# Patient Record
Sex: FEMALE | Race: WHITE | NOT HISPANIC OR LATINO | Employment: UNEMPLOYED | URBAN - METROPOLITAN AREA
[De-identification: names, ages, dates, MRNs, and addresses within clinical notes are randomized per-mention and may not be internally consistent; named-entity substitution may affect disease eponyms.]

---

## 2020-01-23 ENCOUNTER — HOSPITAL ENCOUNTER (EMERGENCY)
Facility: HOSPITAL | Age: 50
Discharge: HOME | End: 2020-01-23
Attending: EMERGENCY MEDICINE | Admitting: EMERGENCY MEDICINE

## 2020-01-23 ENCOUNTER — APPOINTMENT (OUTPATIENT)
Dept: GENERAL RADIOLOGY | Facility: HOSPITAL | Age: 50
End: 2020-01-23

## 2020-01-23 VITALS
SYSTOLIC BLOOD PRESSURE: 125 MMHG | RESPIRATION RATE: 16 BRPM | DIASTOLIC BLOOD PRESSURE: 73 MMHG | WEIGHT: 180 LBS | TEMPERATURE: 99 F | OXYGEN SATURATION: 99 % | HEIGHT: 65 IN | BODY MASS INDEX: 29.99 KG/M2 | HEART RATE: 80 BPM

## 2020-01-23 DIAGNOSIS — S30.0XXA CONTUSION OF BUTTOCK, INITIAL ENCOUNTER: Primary | ICD-10-CM

## 2020-01-23 DIAGNOSIS — M54.30 SCIATICA, UNSPECIFIED LATERALITY: ICD-10-CM

## 2020-01-23 PROCEDURE — 96372 THER/PROPH/DIAG INJ SC/IM: CPT

## 2020-01-23 PROCEDURE — 25010000002 KETOROLAC TROMETHAMINE PER 15 MG: Performed by: EMERGENCY MEDICINE

## 2020-01-23 PROCEDURE — 72220 X-RAY EXAM SACRUM TAILBONE: CPT

## 2020-01-23 PROCEDURE — 99283 EMERGENCY DEPT VISIT LOW MDM: CPT

## 2020-01-23 PROCEDURE — 72170 X-RAY EXAM OF PELVIS: CPT

## 2020-01-23 RX ORDER — VENLAFAXINE HYDROCHLORIDE 150 MG/1
150 CAPSULE, EXTENDED RELEASE ORAL DAILY
COMMUNITY

## 2020-01-23 RX ORDER — ONDANSETRON 4 MG/1
4 TABLET, FILM COATED ORAL ONCE
Status: COMPLETED | OUTPATIENT
Start: 2020-01-23 | End: 2020-01-23

## 2020-01-23 RX ORDER — CYCLOBENZAPRINE HCL 5 MG
5 TABLET ORAL 3 TIMES DAILY PRN
Qty: 20 TABLET | Refills: 0 | Status: SHIPPED | OUTPATIENT
Start: 2020-01-23 | End: 2020-07-20

## 2020-01-23 RX ORDER — NAPROXEN 500 MG/1
500 TABLET ORAL 2 TIMES DAILY PRN
Qty: 20 TABLET | Refills: 0 | Status: SHIPPED | OUTPATIENT
Start: 2020-01-23 | End: 2020-07-20

## 2020-01-23 RX ORDER — KETOROLAC TROMETHAMINE 15 MG/ML
15 INJECTION, SOLUTION INTRAMUSCULAR; INTRAVENOUS ONCE
Status: COMPLETED | OUTPATIENT
Start: 2020-01-23 | End: 2020-01-23

## 2020-01-23 RX ORDER — HYDROCODONE BITARTRATE AND ACETAMINOPHEN 5; 325 MG/1; MG/1
1 TABLET ORAL ONCE
Status: COMPLETED | OUTPATIENT
Start: 2020-01-23 | End: 2020-01-23

## 2020-01-23 RX ADMIN — ONDANSETRON HYDROCHLORIDE 4 MG: 4 TABLET, FILM COATED ORAL at 22:29

## 2020-01-23 RX ADMIN — HYDROCODONE BITARTRATE AND ACETAMINOPHEN 1 TABLET: 5; 325 TABLET ORAL at 22:29

## 2020-01-23 RX ADMIN — KETOROLAC TROMETHAMINE 15 MG: 15 INJECTION, SOLUTION INTRAMUSCULAR; INTRAVENOUS at 22:28

## 2020-01-24 NOTE — DISCHARGE INSTRUCTIONS
Follow up with one of the [facility] Primary Care Providers below to setup primary care. If you need assistance coordinating a primary care appointment with a [facility] Primary Care Provider, please contact the Primary Care Coordinators at [phone] for appointment scheduling.    [facility], Primary Care   [address] , [address]   [place], [place] [address] [phone]    [facility] Internal Medicine & Endocrinology  [address], [address]  [place] [address] [phone]    [facility] Family Medicine  [address], [address]   [place] [address] [phone]    [facility] Primary Care  [address], [address]  [place], [place] [address] [phone]    [facility], Primary Care,   [address], [address]   [place], [place] [address] [phone]    [facility] Primary Care  [address]  [place], [place] [address] [phone]    [facility], Primary Care  [address], [address]  [place], [place] [address] [phone]    [facility] Internal Medicine & Pediatrics  [address], [address]   [place] [address] [phone]    [facility], Primary Care  [address] [place] [address] [phone]      [facility] Primary Care  [address], [address]   [place] [address] [phone]    [facility] Family Medicine  [address] [place] [address] [phone]

## 2020-01-24 NOTE — ED PROVIDER NOTES
Subjective   49-year-old female presents for evaluation of left tailbone and buttocks pain.  She states that 3 days ago she was pumping gas on an icy road when she slipped, fell, and landed awkwardly on her to her left buttocks.  Since that time, she has been experiencing pain in her left buttocks as well as a burning sensation that radiates into her left thigh to her knee.  She denies any accompanying low back pain.  She endorses some mild bruising to her left upper extremity as well.  She is not anticoagulated.  No LOC.  She is ambulatory, albeit with difficulty.  No neck pain.      History provided by:  Patient  Fall   Mechanism of injury: fall    Injury location:  Pelvis, leg and shoulder/arm  Pelvic injury location:  L buttock and L hip  Leg injury location:  L lower leg  Incident location:  Street  Time since incident:  3 days  Fall:     Fall occurred:  Standing    Impact surface:  Ice    Point of impact:  Buttocks  Associated symptoms: nausea        Review of Systems   Gastrointestinal: Positive for nausea.   Musculoskeletal:        Left buttock pain, left arm and left hip pain   Skin:        Left arm and right leg bruising     All other systems reviewed and are negative.      No past medical history on file.    Allergies no known allergies    No past surgical history on file.    No family history on file.    Social History     Socioeconomic History   • Marital status: Single     Spouse name: Not on file   • Number of children: Not on file   • Years of education: Not on file   • Highest education level: Not on file         Objective   Physical Exam   Constitutional: She is oriented to person, place, and time. She appears well-developed and well-nourished. No distress.   Nontoxic-appearing female   HENT:   Head: Normocephalic and atraumatic.   Mouth/Throat: Oropharynx is clear and moist.   Eyes: Pupils are equal, round, and reactive to light. EOM are normal.   Neck:   No midline cervical spine tenderness to  palpation, no step-off or deformity noted   Cardiovascular: Normal rate, regular rhythm, normal heart sounds and intact distal pulses. Exam reveals no gallop and no friction rub.   No murmur heard.  Pulmonary/Chest: Effort normal and breath sounds normal. No respiratory distress. She has no wheezes. She has no rales.   Musculoskeletal: Normal range of motion.   Range of motion of left hip within normal limits and painless, no bony step-off or deformity noted, no pelvic instability, no shortening or rotation of left lower extremity when compared to the right    Range of motion of left shoulder and left elbow within normal limits and painless    No midline tenderness noted to lumbar spine, no step-off or deformity present    Point tenderness noted over left buttock without overlying contusion   Neurological: She is alert and oriented to person, place, and time. She displays normal reflexes. No sensory deficit. She exhibits normal muscle tone.   Neurovascularly intact distally in all fours with bounding distal pulses normal sensation    No saddle anesthesia    Normal gait   Skin: Skin is warm and dry. No rash noted. She is not diaphoretic. No erythema.   2 small contusions noted to left upper arm   Psychiatric: She has a normal mood and affect. Judgment and thought content normal.   Nursing note and vitals reviewed.      Procedures         ED Course  ED Course as of Jan 24 0432   Thu Jan 23, 2020   2220 49-year-old female presents complaining of left buttock pain after mechanical trip and fall 3 days ago while pumping gas.  She also endorses sciatica-like symptoms as well.  No back pain.  No LOC.  No neck pain.  NEXUS negative.  Normal gait.  On my evaluation, patient well-appearing.  Benign exam.  She is neurovascularly intact.  No red flag low back pain signs, symptoms, or history necessitating emergent neuroimaging at this time.  Pain control provided.  We will obtain plain films and will reassess following initial  "interventions.    [DD]   2225 Plain films negative.  Patient reassured and counseled regarding symptomatic management of muscle strain/contusion.  She will follow-up with her primary care physician within the next week.  Additionally, she was given a referral to Dr. Jefferson of neurosurgery and will follow-up if she fails conservative measures or if symptoms worsen or if she develops any red flag neurological symptoms.  In the interim, she was given scripts for NSAIDs and Flexeril and will follow-up as directed.  Agreeable with plan and given appropriate strict return precautions.    [DD]   2228 DMD reviews imaging results with the patient.    [CR]      ED Course User Index  [CR] Coy Osullivan  [DD] Raymond Watts MD           No results found for this or any previous visit (from the past 24 hour(s)).  Note: In addition to lab results from this visit, the labs listed above may include labs taken at another facility or during a different encounter within the last 24 hours. Please correlate lab times with ED admission and discharge times for further clarification of the services performed during this visit.    No orders to display     Vitals:    01/23/20 1933   BP: 135/70   BP Location: Left arm   Patient Position: Sitting   Pulse: 84   Resp: 18   Temp: 99 °F (37.2 °C)   TempSrc: Oral   SpO2: 99%   Weight: 81.6 kg (180 lb)   Height: 165.1 cm (65\")     Medications - No data to display  ECG/EMG Results (last 24 hours)     ** No results found for the last 24 hours. **        No orders to display                    Perry Coma Scale Score: 15                        No results found for this or any previous visit (from the past 24 hour(s)).  Note: In addition to lab results from this visit, the labs listed above may include labs taken at another facility or during a different encounter within the last 24 hours. Please correlate lab times with ED admission and discharge times for further clarification of the services " "performed during this visit.    XR Pelvis 1 or 2 View   Final Result   Negative pelvis.      Signer Name: Rai Gamez MD    Signed: 1/23/2020 10:22 PM    [facility]     [facility]      XR Sacrum & Coccyx   Final Result   Negative sacrum and coccyx.      Signer Name: Mauri Pinto MD    Signed: 1/23/2020 10:22 PM    [email or web address]     [facility]        Vitals:    01/23/20 1933 01/23/20 2228 01/23/20 2251   BP: 135/70 125/73 125/73   BP Location: Left arm  Right arm   Patient Position: Sitting  Lying   Pulse: 84  80   Resp: 18  16   Temp: 99 °F (37.2 °C)     TempSrc: Oral     SpO2: 99%  99%   Weight: 81.6 kg (180 lb)     Height: 165.1 cm (65\")       Medications   HYDROcodone-acetaminophen (NORCO) 5-325 MG per tablet 1 tablet (1 tablet Oral Given 1/23/20 2229)   ketorolac (TORADOL) injection 15 mg (15 mg Intramuscular Given 1/23/20 2228)   ondansetron (ZOFRAN) tablet 4 mg (4 mg Oral Given 1/23/20 2229)     ECG/EMG Results (last 24 hours)     ** No results found for the last 24 hours. **        No orders to display           MDM    Final diagnoses:   Contusion of buttock, initial encounter   Sciatica, unspecified laterality       Documentation assistance provided by amanda Osullivan.  Information recorded by the scribe was done at my direction and has been verified and validated by me.     Coy Osullivan  01/23/20 2204       Raymond Watts MD  01/24/20 0205    "

## 2020-04-11 ENCOUNTER — HOSPITAL ENCOUNTER (EMERGENCY)
Facility: HOSPITAL | Age: 50
Discharge: HOME | End: 2020-04-11
Attending: EMERGENCY MEDICINE | Admitting: EMERGENCY MEDICINE

## 2020-04-11 VITALS
DIASTOLIC BLOOD PRESSURE: 90 MMHG | TEMPERATURE: 98.8 F | WEIGHT: 200 LBS | SYSTOLIC BLOOD PRESSURE: 139 MMHG | BODY MASS INDEX: 33.32 KG/M2 | HEIGHT: 65 IN | HEART RATE: 80 BPM | OXYGEN SATURATION: 99 % | RESPIRATION RATE: 20 BRPM

## 2020-04-11 DIAGNOSIS — L29.9 PRURITIC DERMATITIS: Primary | ICD-10-CM

## 2020-04-11 PROCEDURE — 99282 EMERGENCY DEPT VISIT SF MDM: CPT

## 2020-04-11 RX ORDER — SODIUM CHLORIDE 0.9 % (FLUSH) 0.9 %
10 SYRINGE (ML) INJECTION AS NEEDED
Status: DISCONTINUED | OUTPATIENT
Start: 2020-04-11 | End: 2020-04-11 | Stop reason: HOSPADM

## 2020-04-11 NOTE — ED PROVIDER NOTES
"Subjective   49-year-old female presents for evaluation of \"rash.\"  She states that the rash started approximately 3 days ago on her left flank and has subsequently spread to bilateral flanks, abdomen, and mid back.  She is unsure as to what may have triggered the rash she denies any new exposures or medications.  She states that she has been in quarantine and has not used any new detergents or soaps.  The rash is purely pruritic in nature and is not painful.  She denies any fevers or systemic symptoms.  She denies any similar rash before in the past.  She has been using oral Benadryl and topical steroids without significant relief and states that the rash continues to itch, prompting her visit to the emergency department.          Review of Systems   Constitutional: Negative for chills and fever.   Skin: Positive for rash.   All other systems reviewed and are negative.      Past Medical History:   Diagnosis Date   • RA (rheumatoid arthritis) (CMS/formerly [facility])    • Restless leg        No Known Allergies    Past Surgical History:   Procedure Laterality Date   • APPENDECTOMY     • CHOLECYSTECTOMY     • HYSTERECTOMY         History reviewed. No pertinent family history.    Social History     Socioeconomic History   • Marital status: Single     Spouse name: Not on file   • Number of children: Not on file   • Years of education: Not on file   • Highest education level: Not on file   Tobacco Use   • Smoking status: Current Every Day Smoker     Packs/day: 1.00     Types: Cigarettes   • Smokeless tobacco: Never Used   Substance and Sexual Activity   • Alcohol use: Yes     Comment: occasionally   • Drug use: Never   • Sexual activity: Defer           Objective   Physical Exam   Constitutional: She is oriented to person, place, and time. She appears well-developed and well-nourished. No distress.   Nontoxic-appearing obese female   HENT:   Head: Normocephalic and atraumatic.   Mouth/Throat: Oropharynx is clear and moist.   No mucous " membrane lesions   Cardiovascular: Normal rate, regular rhythm and normal heart sounds. Exam reveals no gallop and no friction rub.   No murmur heard.  Pulmonary/Chest: Effort normal and breath sounds normal. No respiratory distress. She has no wheezes. She has no rales.   Abdominal: Soft. Bowel sounds are normal. She exhibits no distension and no mass. There is no tenderness. There is no rebound and no guarding.   Musculoskeletal: Normal range of motion.   Neurological: She is alert and oriented to person, place, and time.   Skin: She is not diaphoretic.   Pruritic, erythematous, urticarial lesions noted diffusely to bilateral flanks, lower abdomen, and mid back    Lesions are nontender and not warm to palpation, no pain on proportion to exam, negative Nikolsky's sign   Psychiatric: She has a normal mood and affect. Judgment and thought content normal.   Nursing note and vitals reviewed.      Procedures           ED Course  ED Course as of Apr 11 1353   Sat Apr 11, 2020   1352 49-year-old female presents for evaluation of rash.  On arrival to the ED, patient nontoxic-appearing.  No fevers or systemic symptoms.  No mucous membrane lesions.  The patient's rash is pruritic in nature and appears consistent with urticaria versus contact dermatitis.  There is no dermatomal distribution, and the rash is not painful or warm.  She is unsure as to what may have triggered her rash, but it appears consistent with likely allergic process.  I encouraged the patient to continue to use Benadryl and topical steroids; however, given the worsening nature of the rash, I do feel that a course of systemic steroids is indicated at this point.  The patient was given a prescription for oral steroid taper which she will take over the next 2 weeks.  Agreeable with plan and given appropriate strict return precautions.    [DD]      ED Course User Index  [DD] Raymond Watts MD                                   No results found for this or  "any previous visit (from the past 24 hour(s)).  Note: In addition to lab results from this visit, the labs listed above may include labs taken at another facility or during a different encounter within the last 24 hours. Please correlate lab times with ED admission and discharge times for further clarification of the services performed during this visit.    No orders to display     Vitals:    04/11/20 1319 04/11/20 1326 04/11/20 1327   BP: 118/94 139/90    BP Location: Left arm     Patient Position: Sitting     Pulse: 80     Resp: 20     Temp: 98.8 °F (37.1 °C)     TempSrc: Oral     SpO2: 98%  99%   Weight: 90.7 kg (200 lb)     Height: 165.1 cm (65\")       Medications   sodium chloride 0.9 % flush 10 mL (has no administration in time range)     ECG/EMG Results (last 24 hours)     ** No results found for the last 24 hours. **        No orders to display               MDM    Final diagnoses:   Pruritic dermatitis            Raymond Watts MD  04/11/20 7539    "

## 2020-04-11 NOTE — DISCHARGE INSTRUCTIONS
Follow up with one of the [facility] Primary Care Providers below to setup primary care. If you need assistance coordinating a primary care appointment with a [facility] Primary Care Provider, please contact the Primary Care Coordinators at [phone] for appointment scheduling.    [facility], Primary Care   [address] , [address]   [place], [place] [address] [phone]    [facility] Internal Medicine & Endocrinology  [address]  [place] [address] [phone]    [facility] Family Medicine  [address]   [place] [address] [phone]    [facility] Primary Care  [address]  [place], [place] [address] [phone]    [facility], Primary Care,   [address]   [place], [place] [address] [phone]    [facility] Primary Care  [address]  [place], [place] [address] [phone]    [facility], Primary Care  [address], [address]  [place], [place] [address] [phone]    [facility] Internal Medicine & Pediatrics  [address]   [place] [address] [phone]    [facility], Primary Care  [address] [place] [address] [phone]      [facility] Primary Care  [address]   [place] [address] [phone]    [facility] Family Medicine  [address] [place] [address] [phone]

## 2020-07-20 ENCOUNTER — OFFICE VISIT (OUTPATIENT)
Dept: INTERNAL MEDICINE | Facility: CLINIC | Age: 50
End: 2020-07-20

## 2020-07-20 VITALS
WEIGHT: 187.6 LBS | HEART RATE: 94 BPM | BODY MASS INDEX: 31.25 KG/M2 | OXYGEN SATURATION: 97 % | TEMPERATURE: 97.7 F | HEIGHT: 65 IN | RESPIRATION RATE: 16 BRPM | DIASTOLIC BLOOD PRESSURE: 82 MMHG | SYSTOLIC BLOOD PRESSURE: 140 MMHG

## 2020-07-20 DIAGNOSIS — F41.9 ANXIETY AND DEPRESSION: ICD-10-CM

## 2020-07-20 DIAGNOSIS — Z01.419 WELL WOMAN EXAM WITH ROUTINE GYNECOLOGICAL EXAM: ICD-10-CM

## 2020-07-20 DIAGNOSIS — M06.9 RHEUMATOID ARTHRITIS INVOLVING MULTIPLE SITES, UNSPECIFIED RHEUMATOID FACTOR PRESENCE: Primary | ICD-10-CM

## 2020-07-20 DIAGNOSIS — Z23 NEED FOR VACCINATION: ICD-10-CM

## 2020-07-20 DIAGNOSIS — M51.37 DDD (DEGENERATIVE DISC DISEASE), LUMBOSACRAL: ICD-10-CM

## 2020-07-20 DIAGNOSIS — Z20.822 ENCOUNTER FOR LABORATORY TESTING FOR COVID-19 VIRUS: ICD-10-CM

## 2020-07-20 DIAGNOSIS — F32.A ANXIETY AND DEPRESSION: ICD-10-CM

## 2020-07-20 DIAGNOSIS — Z00.00 HEALTHCARE MAINTENANCE: ICD-10-CM

## 2020-07-20 DIAGNOSIS — N76.0 ACUTE VAGINITIS: ICD-10-CM

## 2020-07-20 DIAGNOSIS — G25.81 RESTLESS LEGS SYNDROME: ICD-10-CM

## 2020-07-20 DIAGNOSIS — Z86.32 HISTORY OF GESTATIONAL DIABETES: ICD-10-CM

## 2020-07-20 DIAGNOSIS — Z13.220 SCREENING, LIPID: ICD-10-CM

## 2020-07-20 DIAGNOSIS — K58.0 IRRITABLE BOWEL SYNDROME WITH DIARRHEA: ICD-10-CM

## 2020-07-20 DIAGNOSIS — Z13.29 SCREENING FOR THYROID DISORDER: ICD-10-CM

## 2020-07-20 DIAGNOSIS — Z12.11 SCREENING FOR MALIGNANT NEOPLASM OF COLON: ICD-10-CM

## 2020-07-20 DIAGNOSIS — Z11.59 ENCOUNTER FOR HEPATITIS C SCREENING TEST FOR LOW RISK PATIENT: ICD-10-CM

## 2020-07-20 DIAGNOSIS — Z12.31 ENCOUNTER FOR SCREENING MAMMOGRAM FOR MALIGNANT NEOPLASM OF BREAST: ICD-10-CM

## 2020-07-20 DIAGNOSIS — A60.9 HSV (HERPES SIMPLEX VIRUS) ANOGENITAL INFECTION: ICD-10-CM

## 2020-07-20 PROCEDURE — 90471 IMMUNIZATION ADMIN: CPT | Performed by: INTERNAL MEDICINE

## 2020-07-20 PROCEDURE — 99204 OFFICE O/P NEW MOD 45 MIN: CPT | Performed by: INTERNAL MEDICINE

## 2020-07-20 PROCEDURE — 90732 PPSV23 VACC 2 YRS+ SUBQ/IM: CPT | Performed by: INTERNAL MEDICINE

## 2020-07-20 RX ORDER — VALACYCLOVIR HYDROCHLORIDE 500 MG/1
500 TABLET, FILM COATED ORAL 2 TIMES DAILY
Qty: 6 TABLET | Refills: 0 | Status: SHIPPED | OUTPATIENT
Start: 2020-07-20 | End: 2020-07-23

## 2020-07-20 RX ORDER — ROPINIROLE 2 MG/1
2 TABLET, FILM COATED ORAL 2 TIMES DAILY
Qty: 60 TABLET | Refills: 5 | Status: SHIPPED | OUTPATIENT
Start: 2020-07-20 | End: 2021-01-04

## 2020-07-20 RX ORDER — FLUCONAZOLE 150 MG/1
150 TABLET ORAL ONCE
Qty: 2 TABLET | Refills: 0 | Status: SHIPPED | OUTPATIENT
Start: 2020-07-20 | End: 2020-07-20

## 2020-07-20 RX ORDER — RISPERIDONE 0.5 MG/1
0.5 TABLET ORAL
COMMUNITY
End: 2020-09-25

## 2020-07-20 RX ORDER — ROPINIROLE 2 MG/1
2 TABLET, FILM COATED ORAL 2 TIMES DAILY
COMMUNITY
End: 2020-07-20
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
## 2020-07-21 PROBLEM — M51.37 DDD (DEGENERATIVE DISC DISEASE), LUMBOSACRAL: Status: ACTIVE | Noted: 2020-07-21

## 2020-07-21 PROBLEM — G25.81 RESTLESS LEGS SYNDROME: Status: ACTIVE | Noted: 2020-07-21

## 2020-07-21 PROBLEM — A60.9 HSV (HERPES SIMPLEX VIRUS) ANOGENITAL INFECTION: Status: ACTIVE | Noted: 2020-07-21

## 2020-07-28 ENCOUNTER — TREATMENT (OUTPATIENT)
Dept: PHYSICAL THERAPY | Facility: CLINIC | Age: 50
End: 2020-07-28

## 2020-07-28 DIAGNOSIS — M54.16 LEFT LUMBAR RADICULOPATHY: Primary | ICD-10-CM

## 2020-07-28 PROCEDURE — 97162 PT EVAL MOD COMPLEX 30 MIN: CPT | Performed by: PHYSICAL THERAPIST

## 2020-07-28 PROCEDURE — 97110 THERAPEUTIC EXERCISES: CPT | Performed by: PHYSICAL THERAPIST

## 2020-07-28 NOTE — PROGRESS NOTES
Physical Therapy Initial Evaluation and Plan of Care    Subjective Evaluation    History of Present Illness  Mechanism of injury: Pt is a 49 year old female presenting to the clinic with pain and numbness down both legs. This began after she had a fall in Jan on ice. She fell right on her sacrum/coccyx. The outside of her left leg is completely numb all the way down to her last 2 or 3 toes. She has a history of severe DDD. She has fallen multiple times, some have been severe. She states she has fallen at least 10-15 times. There is no pattern to these falls. She reports if she sits for a long time she feels locked up. No medication seems to help. She has RA and feels a little flared up right now. Pt reports that she normally walks several miles per day, but she has not been able to since her fall.      Patient Occupation: Not working Quality of life: good    Pain  At worst pain rating: 10  Location: Low back/coccyx  Quality: dull ache and needle-like  Aggravating factors: stairs, sleeping and ambulation    Social Support  Lives in: condominium  Lives with: alone    Diagnostic Tests  X-ray: abnormal (Severe DDD)    Patient Goals  Patient goals for therapy: return to sport/leisure activities, independence with ADLs/IADLs, decreased pain, improved balance and increased strength          Objective          Neurological Testing     Sensation     Lumbar   Left   Diminished: light touch    Right   Intact: light touch    Comments   Left light touch: L2, L3, L5, S1    Reflexes   Left   Patellar (L4): normal (2+)  Achilles (S1): normal (2+)  Clonus sign: negative    Right   Patellar (L4): normal (2+)  Achilles (S1): normal (2+)  Clonus sign: negative    Active Range of Motion     Lumbar   Flexion: 55 degrees with pain  Extension: 12 degrees   Left lateral flexion: 11 degrees   Right lateral flexion: 7 degrees with pain    Strength/Myotome Testing     Left Hip   Planes of Motion   Flexion: 2  Abduction: 0    Right Hip   Planes  of Motion   Flexion: 5    Left Knee   Flexion: 3-  Extension: 3+    Right Knee   Flexion: 5  Extension: 5    Left Ankle/Foot   Dorsiflexion: 2-  Plantar flexion: 3-    Right Ankle/Foot   Dorsiflexion: 5  Plantar flexion: 5    Tests     Lumbar     Left   Positive passive SLR.     Right   Negative passive SLR.     Left Pelvic Girdle/Sacrum   Positive: sacrum compression.   Negative: gapping.     Right Pelvic Girdle/Sacrum   Positive: sacrum compression.   Negative: gapping.     Ambulation     Comments   Pt demonstrates right Trendelenburg, decreased left dorsiflexion, and decreased trunk rotation.           Assessment & Plan     Assessment  Impairments: abnormal coordination, abnormal gait, abnormal muscle firing, abnormal muscle tone, abnormal or restricted ROM, activity intolerance, impaired balance, impaired physical strength, lacks appropriate home exercise program, pain with function and safety issue  Assessment details: Pt is a 49 year old female presenting with severe radicular symptoms down her left leg. She has greatly decreased strength in her left LE. She has had multiple falls as a result of the numbness in her leg. She has decreased lumbar AROM. She has positive SLR and positive sacrum compression. Her impairments are limiting her ability to safely go up and down stairs, walk, and take care of herself. Pt would benefit from skilled PT services in order to decrease her pain, increase her strength, and increase her ROM so she can return to her PLOF.  Prognosis: good  Functional Limitations: sleeping, walking, uncomfortable because of pain, moving in bed, standing and stooping  Goals  Plan Goals: SHORT TERM GOALS:     2 weeks  1. Pt independent with HEP  2. Pt to demonstrate trunk AROM 25-50% of expected norms to allow for improved ability to perform ADL's  3. Pt to demonstrate bilateral hip strength 4/5 in all planes to improved stability of the core/trunk     LONG TERM GOALS:   6 weeks  1. Pt to  demonstrate trunk AROM % of expected norms to allow for improved ability to perform functional activities  2. Pt to demonstrate ability to perform full functional squat with good form and without increased pain in the low back   3. Pt to report being able to work full shift or work in the home without increase in pain in the back  4. Pt to report cessation of pain/numbness/tingling into the left leg to show decreased nerve compression    Plan  Therapy options: will be seen for skilled physical therapy services  Planned modality interventions: cryotherapy, electrical stimulation/Russian stimulation, TENS and high voltage pulsed current (pain management)  Planned therapy interventions: abdominal trunk stabilization, balance/weight-bearing training, body mechanics training, fine motor coordination training, functional ROM exercises, flexibility, gait training, home exercise program, joint mobilization, manual therapy, neuromuscular re-education, soft tissue mobilization, spinal/joint mobilization, strengthening, stretching and therapeutic activities  Duration in visits: 2  Duration in weeks: 6        Manual Therapy:         mins  03236;  Therapeutic Exercise:    10     mins  [id number];     Neuromuscular Meeta:        mins  92918;    Therapeutic Activity:          mins  44191;     Gait Training:           mins  72656;     Ultrasound:          mins  44972;    Electrical Stimulation:         mins  28285 ( );  Dry Needling          mins self-pay    Timed Treatment:   10   mins   Total Treatment:     60   mins    PT SIGNATURE: Myrna Curiel PT   DATE TREATMENT INITIATED: 7/28/2020    Initial Certification  Certification Period: 10/26/2020  I certify that the therapy services are furnished while this patient is under my care.  The services outlined above are required by this patient, and will be reviewed every 90 days.     PHYSICIAN: Estela Garrett MD      DATE:     Please sign and return via fax to  [phone].. Thank you, [facility].

## 2020-07-31 ENCOUNTER — APPOINTMENT (OUTPATIENT)
Dept: MAMMOGRAPHY | Facility: HOSPITAL | Age: 50
End: 2020-07-31

## 2020-08-02 ENCOUNTER — HOSPITAL ENCOUNTER (EMERGENCY)
Facility: HOSPITAL | Age: 50
Discharge: HOME | End: 2020-08-02
Attending: EMERGENCY MEDICINE | Admitting: EMERGENCY MEDICINE

## 2020-08-02 ENCOUNTER — APPOINTMENT (OUTPATIENT)
Dept: MRI IMAGING | Facility: HOSPITAL | Age: 50
End: 2020-08-02

## 2020-08-02 VITALS
RESPIRATION RATE: 18 BRPM | HEIGHT: 65 IN | DIASTOLIC BLOOD PRESSURE: 84 MMHG | TEMPERATURE: 99 F | BODY MASS INDEX: 29.99 KG/M2 | SYSTOLIC BLOOD PRESSURE: 114 MMHG | OXYGEN SATURATION: 95 % | HEART RATE: 88 BPM | WEIGHT: 180 LBS

## 2020-08-02 DIAGNOSIS — M54.42 ACUTE LEFT-SIDED LOW BACK PAIN WITH LEFT-SIDED SCIATICA: Primary | ICD-10-CM

## 2020-08-02 LAB
HOLD SPECIMEN: NORMAL
HOLD SPECIMEN: NORMAL
WHOLE BLOOD HOLD SPECIMEN: NORMAL
WHOLE BLOOD HOLD SPECIMEN: NORMAL

## 2020-08-02 PROCEDURE — 96375 TX/PRO/DX INJ NEW DRUG ADDON: CPT

## 2020-08-02 PROCEDURE — 99283 EMERGENCY DEPT VISIT LOW MDM: CPT

## 2020-08-02 PROCEDURE — 25010000002 ONDANSETRON PER 1 MG: Performed by: EMERGENCY MEDICINE

## 2020-08-02 PROCEDURE — 51798 US URINE CAPACITY MEASURE: CPT

## 2020-08-02 PROCEDURE — 96374 THER/PROPH/DIAG INJ IV PUSH: CPT

## 2020-08-02 PROCEDURE — 25010000002 MORPHINE PER 10 MG: Performed by: EMERGENCY MEDICINE

## 2020-08-02 PROCEDURE — 72148 MRI LUMBAR SPINE W/O DYE: CPT

## 2020-08-02 RX ORDER — ONDANSETRON 2 MG/ML
4 INJECTION INTRAMUSCULAR; INTRAVENOUS ONCE
Status: COMPLETED | OUTPATIENT
Start: 2020-08-02 | End: 2020-08-02

## 2020-08-02 RX ORDER — HYDROCODONE BITARTRATE AND ACETAMINOPHEN 5; 325 MG/1; MG/1
1 TABLET ORAL EVERY 6 HOURS PRN
Qty: 7 TABLET | Refills: 0 | Status: SHIPPED | OUTPATIENT
Start: 2020-08-02 | End: 2020-09-25

## 2020-08-02 RX ORDER — MORPHINE SULFATE 4 MG/ML
4 INJECTION, SOLUTION INTRAMUSCULAR; INTRAVENOUS ONCE
Status: COMPLETED | OUTPATIENT
Start: 2020-08-02 | End: 2020-08-02

## 2020-08-02 RX ORDER — SODIUM CHLORIDE 0.9 % (FLUSH) 0.9 %
10 SYRINGE (ML) INJECTION AS NEEDED
Status: DISCONTINUED | OUTPATIENT
Start: 2020-08-02 | End: 2020-08-02 | Stop reason: HOSPADM

## 2020-08-02 RX ADMIN — MORPHINE SULFATE 4 MG: 4 INJECTION, SOLUTION INTRAMUSCULAR; INTRAVENOUS at 13:20

## 2020-08-02 RX ADMIN — ONDANSETRON 4 MG: 2 INJECTION INTRAMUSCULAR; INTRAVENOUS at 13:20
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
## 2020-08-05 ENCOUNTER — TELEPHONE (OUTPATIENT)
Dept: PHYSICAL THERAPY | Facility: CLINIC | Age: 50
End: 2020-08-05

## 2020-08-11 ENCOUNTER — TELEPHONE (OUTPATIENT)
Dept: INTERNAL MEDICINE | Facility: CLINIC | Age: 50
End: 2020-08-11

## 2020-08-11 NOTE — TELEPHONE ENCOUNTER
PATIENT CALLED AND MADE A MYCHART VISIT FOR THIS WEEK TO SEE DR. MARQUEZ.     THE PATIENT WANTED TO KNOW IF SHE NEEDED TO BE SEEN TO GO OVER WHAT THE PROVIDER AND THE PT PROVIDER DECIDED    PATIENT ALSO REQUESTED IF THE PROVIDER COULD CALL IN SOME MUSCLE RELAXER     [facility] -[phone]    PLEASE CALL BACK AND ADVISE [phone]

## 2020-08-12 DIAGNOSIS — M51.37 DDD (DEGENERATIVE DISC DISEASE), LUMBOSACRAL: Primary | ICD-10-CM

## 2020-08-12 DIAGNOSIS — M21.379 FOOT-DROP, UNSPECIFIED LATERALITY: ICD-10-CM

## 2020-08-12 RX ORDER — CYCLOBENZAPRINE HCL 5 MG
5-10 TABLET ORAL 3 TIMES DAILY PRN
Qty: 30 TABLET | Refills: 0 | Status: SHIPPED | OUTPATIENT
Start: 2020-08-12 | End: 2020-08-20 | Stop reason: SDUPTHER

## 2020-08-12 NOTE — TELEPHONE ENCOUNTER
Please let the patient know that I have called in a muscle relaxer. She does not need to come in to be seen. I have not been able to get CT scan results from [facility] but I discussed this with her PT and I think a neurosurgery referral is warranted. I will refer her today.

## 2020-08-19 ENCOUNTER — HOSPITAL ENCOUNTER (OUTPATIENT)
Dept: MAMMOGRAPHY | Facility: HOSPITAL | Age: 50
Discharge: HOME | End: 2020-08-19

## 2020-08-19 DIAGNOSIS — Z12.31 ENCOUNTER FOR SCREENING MAMMOGRAM FOR MALIGNANT NEOPLASM OF BREAST: ICD-10-CM

## 2020-08-19 RX ORDER — SODIUM, POTASSIUM,MAG SULFATES 17.5-3.13G
2 SOLUTION, RECONSTITUTED, ORAL ORAL TAKE AS DIRECTED
Qty: 354 ML | Refills: 0 | Status: SHIPPED | OUTPATIENT
Start: 2020-08-19 | End: 2020-09-02 | Stop reason: SDUPTHER

## 2020-08-20 DIAGNOSIS — G25.81 RESTLESS LEGS SYNDROME: ICD-10-CM

## 2020-08-20 DIAGNOSIS — M51.37 DDD (DEGENERATIVE DISC DISEASE), LUMBOSACRAL: ICD-10-CM

## 2020-08-20 RX ORDER — CYCLOBENZAPRINE HCL 5 MG
10 TABLET ORAL 3 TIMES DAILY PRN
Qty: 90 TABLET | Refills: 1 | Status: SHIPPED | OUTPATIENT
Start: 2020-08-20 | End: 2021-01-28

## 2020-08-20 RX ORDER — ROPINIROLE 2 MG/1
2 TABLET, FILM COATED ORAL 2 TIMES DAILY
Qty: 60 TABLET | Refills: 5 | OUTPATIENT
Start: 2020-08-20

## 2020-08-20 NOTE — TELEPHONE ENCOUNTER
Let pt know ropinirole was sent to her pharmacy in July, pt stated that she is out of the cyclobenzaprine. Last fill was 8/12/20 for #30, However pt stated she takes 2 tablets TID and is already out. Her neuro referral looks to still be pending.

## 2020-08-23 DIAGNOSIS — N64.4 BREAST PAIN: Primary | ICD-10-CM

## 2020-08-24 ENCOUNTER — APPOINTMENT (OUTPATIENT)
Dept: PREADMISSION TESTING | Facility: HOSPITAL | Age: 50
End: 2020-08-24

## 2020-08-24 LAB — SARS-COV-2 RNA RESP QL NAA+PROBE: NOT DETECTED

## 2020-08-24 PROCEDURE — C9803 HOPD COVID-19 SPEC COLLECT: HCPCS

## 2020-08-24 PROCEDURE — 87635 SARS-COV-2 COVID-19 AMP PRB: CPT | Performed by: INTERNAL MEDICINE

## 2020-08-26 ENCOUNTER — TELEPHONE (OUTPATIENT)
Dept: INTERNAL MEDICINE | Facility: CLINIC | Age: 50
End: 2020-08-26

## 2020-08-26 NOTE — TELEPHONE ENCOUNTER
Diagnostic mammogram was ordered on 8/23. She should call the breast center to try and get it scheduled.

## 2020-08-26 NOTE — TELEPHONE ENCOUNTER
PATIENT CALLED REGARDING MAMMOGRAM REFERRAL, [facility] REQUESTING MAMMOGRAM FOR DIAGNOSTIC SCREENING FOR NEW BREAST PROBLEMS. PLEASE UPDATE      Shahana Cristobal       [phone]

## 2020-09-02 RX ORDER — SODIUM, POTASSIUM,MAG SULFATES 17.5-3.13G
2 SOLUTION, RECONSTITUTED, ORAL ORAL TAKE AS DIRECTED
Qty: 354 ML | Refills: 0 | Status: SHIPPED | OUTPATIENT
Start: 2020-09-02 | End: 2020-09-25

## 2020-09-06 ENCOUNTER — APPOINTMENT (OUTPATIENT)
Dept: PREADMISSION TESTING | Facility: HOSPITAL | Age: 50
End: 2020-09-06

## 2020-09-06 PROCEDURE — C9803 HOPD COVID-19 SPEC COLLECT: HCPCS

## 2020-09-06 PROCEDURE — U0004 COV-19 TEST NON-CDC HGH THRU: HCPCS

## 2020-09-07 LAB — SARS-COV-2 RNA NOSE QL NAA+PROBE: NOT DETECTED

## 2020-09-08 ENCOUNTER — OUTSIDE FACILITY SERVICE (OUTPATIENT)
Dept: GASTROENTEROLOGY | Facility: CLINIC | Age: 50
End: 2020-09-08

## 2020-09-08 PROCEDURE — 45378 DIAGNOSTIC COLONOSCOPY: CPT | Performed by: INTERNAL MEDICINE

## 2020-09-09 ENCOUNTER — OFFICE VISIT (OUTPATIENT)
Dept: NEUROSURGERY | Facility: CLINIC | Age: 50
End: 2020-09-09

## 2020-09-09 DIAGNOSIS — R29.898 WEAKNESS OF BOTH LOWER EXTREMITIES: Primary | ICD-10-CM

## 2020-09-09 DIAGNOSIS — M54.50 LOW BACK PAIN, UNSPECIFIED BACK PAIN LATERALITY, UNSPECIFIED CHRONICITY, UNSPECIFIED WHETHER SCIATICA PRESENT: ICD-10-CM

## 2020-09-09 PROCEDURE — 99242 OFF/OP CONSLTJ NEW/EST SF 20: CPT | Performed by: NEUROLOGICAL SURGERY

## 2020-09-09 RX ORDER — GABAPENTIN 300 MG/1
300 CAPSULE ORAL 3 TIMES DAILY
Qty: 90 CAPSULE | Refills: 0 | OUTPATIENT
Start: 2020-09-09 | End: 2021-02-07

## 2020-09-09 NOTE — PROGRESS NOTES
Subjective     Chief Complaint: Low back pain    Patient ID: Shahana Cristobal is a 49 y.o. female seen for consultation today at the request of  Estela Garrett MD    Back Pain         This is a 49-year-old woman who presents to my office with chief complaints of severe left leg pain and weakness.  This stems from a fall that she sustained in February when she slipped on some ice.  She has had persistent tailbone pain with pain, numbness, and weakness in her left leg since that time.  She did a single session of physical therapy and states that this made her symptoms worse.  She has not tried any pain management or formal physical therapy.    Her pain is exacerbated by movement.  Is alleviated by rest.    She endorses tobacco abuse.  She denies any alcohol or drug abuse.    The following portions of the patient's history were reviewed and updated as appropriate: allergies, current medications, past family history, past medical history, past social history, past surgical history and problem list.    Family history:   Family History   Problem Relation Age of Onset   • Rheum arthritis Father    • Thyroid disease Father    • Hyperlipidemia Father    • Uterine cancer Mother 33   • Ovarian cancer Mother 36   • Uterine cancer Sister    • Thyroid disease Sister    • Uterine cancer Sister    • Thyroid disease Sister    • Uterine cancer Maternal Aunt    • Uterine cancer Maternal Aunt    • Breast cancer Maternal Grandmother    • Breast cancer Paternal Grandmother        Social history:   Social History     Socioeconomic History   • Marital status:      Spouse name: Not on file   • Number of children: Not on file   • Years of education: Not on file   • Highest education level: Not on file   Tobacco Use   • Smoking status: Current Every Day Smoker     Packs/day: 1.00     Years: 35.00     Pack years: 35.00     Types: Cigarettes   • Smokeless tobacco: Never Used   • Tobacco comment: Quit when pregnant and  breastfeeding   Substance and Sexual Activity   • Alcohol use: Yes     Comment: very rare typically, more often during COVID19   • Drug use: Yes     Types: Marijuana     Comment: 25mg edible marijuana daily- LAST USE A COUPLE OF WEEKS AGO    • Sexual activity: Not Currently     Birth control/protection: Surgical       Review of Systems   Constitutional: Positive for activity change and appetite change.   Musculoskeletal: Positive for back pain and gait problem.   Allergic/Immunologic: Positive for environmental allergies.   Psychiatric/Behavioral: Positive for sleep disturbance. The patient is nervous/anxious.    All other systems reviewed and are negative.      Objective   There were no vitals taken for this visit.  There is no height or weight on file to calculate BMI.    Physical Exam   Constitutional: She is oriented to person, place, and time. She appears well-developed.  Non-toxic appearance.   HENT:   Head: Normocephalic and atraumatic.   Right Ear: Hearing normal.   Left Ear: Hearing normal.   Nose: Nose normal.   Eyes: Pupils are equal, round, and reactive to light. Conjunctivae, EOM and lids are normal.   Neck: Normal range of motion. No JVD present.   Cardiovascular: Normal rate and regular rhythm.   Pulses:       Radial pulses are 2+ on the right side, and 2+ on the left side.   Pulmonary/Chest: Effort normal. No stridor. No respiratory distress. She has no wheezes.   Neurological: She is alert and oriented to person, place, and time. She has normal reflexes. She displays normal reflexes. No cranial nerve deficit. She exhibits normal muscle tone. GCS eye subscore is 4. GCS verbal subscore is 5. GCS motor subscore is 6.   Reflex Scores:       Patellar reflexes are 2+ on the right side and 2+ on the left side.       Achilles reflexes are 2+ on the right side and 2+ on the left side.  Skin: Skin is warm and dry. No rash noted. No erythema.   Psychiatric: She has a normal mood and affect. Her behavior is  normal. Judgment and thought content normal.   Nursing note and vitals reviewed.        Assessment/Plan     Independent Review of Radiographic Studies:      Available for my review is a MRI of the lumbar spine which was performed on August 2, 2020.  There is a broad-based disc bulge at L5-S1 which is characterized by bilateral extruded fragments which do not appear to cause any significant lateral recess stenosis.  There is mild intraforaminal stenosis.  There are Modic endplate changes present at L5-S1.    Medical Decision Making:      This is a 49-year-old woman with an approximately 7-month history of coccygeal pain, left leg pain, and left leg weakness and numbness.  Her pain and weakness are in a nondermatomal distribution, none are July identify any obvious structural etiology that would explain her significant complaints in the left lower extremity.    I would like to order a nerve conduction tests and I will give the patient some gabapentin to hopefully help her with some of the neuropathic pain in her left leg.  I do not think her degenerative disc disease at L5-S1 is likely explaining her symptoms, therefore I think a role for surgical intervention is unlikely in the setting.  I will follow-up with her after her EMG/nerve conduction test is been completed.    Diagnoses and all orders for this visit:    Weakness of both lower extremities  -     EMG & Nerve Conduction Test; Future  -     gabapentin (NEURONTIN) 300 MG capsule; Take 1 capsule by mouth 3 (Three) Times a Day.    Low back pain, unspecified back pain laterality, unspecified chronicity, unspecified whether sciatica present  -     gabapentin (NEURONTIN) 300 MG capsule; Take 1 capsule by mouth 3 (Three) Times a Day.        No follow-ups on file.           This document signed by AMI Dawson MD September 9, 2020 12:19

## 2020-09-14 ENCOUNTER — TELEPHONE (OUTPATIENT)
Dept: NEUROSURGERY | Facility: CLINIC | Age: 50
End: 2020-09-14

## 2020-09-14 DIAGNOSIS — N76.0 ACUTE VAGINITIS: ICD-10-CM

## 2020-09-14 DIAGNOSIS — A60.9 HSV (HERPES SIMPLEX VIRUS) ANOGENITAL INFECTION: ICD-10-CM

## 2020-09-14 RX ORDER — VALACYCLOVIR HYDROCHLORIDE 500 MG/1
TABLET, FILM COATED ORAL
Qty: 6 TABLET | Refills: 0 | OUTPATIENT
Start: 2020-09-14

## 2020-09-14 NOTE — TELEPHONE ENCOUNTER
Pt called stating medication prescribed during last office visit has not been sent to pharmacy.     I have called medication in to pt's pharmacy.

## 2020-09-20 PROBLEM — M35.3 POLYMYALGIA RHEUMATICA (HCC): Status: ACTIVE | Noted: 2020-09-20

## 2020-09-20 PROBLEM — I10 BENIGN ESSENTIAL HYPERTENSION: Status: ACTIVE | Noted: 2020-09-20

## 2020-09-20 PROBLEM — Z01.419 WELL WOMAN EXAM: Status: ACTIVE | Noted: 2020-09-20

## 2020-09-20 PROBLEM — N30.10 CHRONIC INTERSTITIAL CYSTITIS: Status: ACTIVE | Noted: 2020-09-20

## 2020-09-25 ENCOUNTER — LAB (OUTPATIENT)
Dept: LAB | Facility: HOSPITAL | Age: 50
End: 2020-09-25

## 2020-09-25 ENCOUNTER — OFFICE VISIT (OUTPATIENT)
Dept: OBSTETRICS AND GYNECOLOGY | Facility: CLINIC | Age: 50
End: 2020-09-25

## 2020-09-25 VITALS
DIASTOLIC BLOOD PRESSURE: 84 MMHG | WEIGHT: 184 LBS | RESPIRATION RATE: 14 BRPM | SYSTOLIC BLOOD PRESSURE: 122 MMHG | BODY MASS INDEX: 30.62 KG/M2

## 2020-09-25 DIAGNOSIS — Z86.32 HISTORY OF GESTATIONAL DIABETES: ICD-10-CM

## 2020-09-25 DIAGNOSIS — Z13.29 SCREENING FOR THYROID DISORDER: ICD-10-CM

## 2020-09-25 DIAGNOSIS — Z20.822 ENCOUNTER FOR LABORATORY TESTING FOR COVID-19 VIRUS: ICD-10-CM

## 2020-09-25 DIAGNOSIS — A63.0 CONDYLOMA ACUMINATUM IN FEMALE: ICD-10-CM

## 2020-09-25 DIAGNOSIS — Z13.220 SCREENING, LIPID: ICD-10-CM

## 2020-09-25 DIAGNOSIS — Z01.419 WELL WOMAN EXAM: Primary | ICD-10-CM

## 2020-09-25 DIAGNOSIS — Z11.59 ENCOUNTER FOR HEPATITIS C SCREENING TEST FOR LOW RISK PATIENT: ICD-10-CM

## 2020-09-25 DIAGNOSIS — Z00.00 HEALTHCARE MAINTENANCE: ICD-10-CM

## 2020-09-25 PROBLEM — A60.9 HSV (HERPES SIMPLEX VIRUS) ANOGENITAL INFECTION: Status: RESOLVED | Noted: 2020-07-21 | Resolved: 2020-09-25

## 2020-09-25 PROBLEM — I10 BENIGN ESSENTIAL HYPERTENSION: Status: RESOLVED | Noted: 2020-09-20 | Resolved: 2020-09-25

## 2020-09-25 LAB
ALBUMIN SERPL-MCNC: 4.5 G/DL (ref 3.5–5.2)
ALBUMIN/GLOB SERPL: 1.3 G/DL
ALP SERPL-CCNC: 63 U/L (ref 39–117)
ALT SERPL W P-5'-P-CCNC: 16 U/L (ref 1–33)
ANION GAP SERPL CALCULATED.3IONS-SCNC: 11.2 MMOL/L (ref 5–15)
AST SERPL-CCNC: 16 U/L (ref 1–32)
BILIRUB SERPL-MCNC: 0.2 MG/DL (ref 0–1.2)
BUN SERPL-MCNC: 7 MG/DL (ref 6–20)
BUN/CREAT SERPL: 10.3 (ref 7–25)
CALCIUM SPEC-SCNC: 10.1 MG/DL (ref 8.6–10.5)
CHLORIDE SERPL-SCNC: 101 MMOL/L (ref 98–107)
CHOLEST SERPL-MCNC: 179 MG/DL (ref 0–200)
CO2 SERPL-SCNC: 27.8 MMOL/L (ref 22–29)
CREAT SERPL-MCNC: 0.68 MG/DL (ref 0.57–1)
DEPRECATED RDW RBC AUTO: 42.5 FL (ref 37–54)
ERYTHROCYTE [DISTWIDTH] IN BLOOD BY AUTOMATED COUNT: 13.1 % (ref 12.3–15.4)
GFR SERPL CREATININE-BSD FRML MDRD: 92 ML/MIN/1.73
GLOBULIN UR ELPH-MCNC: 3.5 GM/DL
GLUCOSE SERPL-MCNC: 93 MG/DL (ref 65–99)
HBA1C MFR BLD: 5.1 % (ref 4.8–5.6)
HCT VFR BLD AUTO: 43.3 % (ref 34–46.6)
HCV AB SER DONR QL: NORMAL
HDLC SERPL-MCNC: 50 MG/DL (ref 40–60)
HGB BLD-MCNC: 14.3 G/DL (ref 12–15.9)
LDLC SERPL CALC-MCNC: 110 MG/DL (ref 0–100)
LDLC/HDLC SERPL: 2.21 {RATIO}
MCH RBC QN AUTO: 29.4 PG (ref 26.6–33)
MCHC RBC AUTO-ENTMCNC: 33 G/DL (ref 31.5–35.7)
MCV RBC AUTO: 88.9 FL (ref 79–97)
PLATELET # BLD AUTO: 379 10*3/MM3 (ref 140–450)
PMV BLD AUTO: 10.4 FL (ref 6–12)
POTASSIUM SERPL-SCNC: 3.7 MMOL/L (ref 3.5–5.2)
PROT SERPL-MCNC: 8 G/DL (ref 6–8.5)
RBC # BLD AUTO: 4.87 10*6/MM3 (ref 3.77–5.28)
SODIUM SERPL-SCNC: 140 MMOL/L (ref 136–145)
TRIGL SERPL-MCNC: 93 MG/DL (ref 0–150)
TSH SERPL DL<=0.05 MIU/L-ACNC: 2.44 UIU/ML (ref 0.27–4.2)
VLDLC SERPL-MCNC: 18.6 MG/DL (ref 5–40)
WBC # BLD AUTO: 12.06 10*3/MM3 (ref 3.4–10.8)

## 2020-09-25 PROCEDURE — 83036 HEMOGLOBIN GLYCOSYLATED A1C: CPT | Performed by: INTERNAL MEDICINE

## 2020-09-25 PROCEDURE — 80053 COMPREHEN METABOLIC PANEL: CPT | Performed by: INTERNAL MEDICINE

## 2020-09-25 PROCEDURE — 86769 SARS-COV-2 COVID-19 ANTIBODY: CPT | Performed by: INTERNAL MEDICINE

## 2020-09-25 PROCEDURE — 99386 PREV VISIT NEW AGE 40-64: CPT | Performed by: OBSTETRICS & GYNECOLOGY

## 2020-09-25 PROCEDURE — 86803 HEPATITIS C AB TEST: CPT | Performed by: INTERNAL MEDICINE

## 2020-09-25 PROCEDURE — 80061 LIPID PANEL: CPT | Performed by: INTERNAL MEDICINE

## 2020-09-25 PROCEDURE — 84443 ASSAY THYROID STIM HORMONE: CPT | Performed by: INTERNAL MEDICINE

## 2020-09-25 PROCEDURE — 85027 COMPLETE CBC AUTOMATED: CPT | Performed by: INTERNAL MEDICINE

## 2020-09-25 RX ORDER — VALACYCLOVIR HYDROCHLORIDE 1 G/1
1000 TABLET, FILM COATED ORAL DAILY
Qty: 5 TABLET | Refills: 4 | Status: SHIPPED | OUTPATIENT
Start: 2020-09-25 | End: 2021-03-17 | Stop reason: SDUPTHER

## 2020-09-25 RX ORDER — IMIQUIMOD 12.5 MG/.25G
CREAM TOPICAL 3 TIMES WEEKLY
Qty: 12 PACKET | Refills: 3 | OUTPATIENT
Start: 2020-09-25 | End: 2021-02-16

## 2020-09-25 NOTE — PROGRESS NOTES
Subjective   Chief Complaint   Patient presents with   • Gynecologic Exam     Shahana Cristobal is a 49 y.o. year old  who is S/P hysterectomy presenting to be seen for her annual exam.      SEXUAL Hx:  She is currently sexually active.  In the past year there there has been NO new sexual partners.    Condoms are never used.  She would not like to be screened for STD's at today's exam.  HEALTH Hx:  She exercises regularly: yes.  She wears her seat belt: yes.  She has concerns about domestic violence: no.  OTHER THINGS SHE WANTS TO DISCUSS TODAY:  Nothing else    The following portions of the patient's history were reviewed and updated as appropriate:problem list, current medications, allergies, past family history, past medical history, past social history and past surgical history.    Social History    Tobacco Use      Smoking status: Current Every Day Smoker        Packs/day: 1.00        Years: 35.00        Pack years: 35        Types: Cigarettes        Start date:       Smokeless tobacco: Never Used      Tobacco comment: Quit when pregnant and breastfeeding    Review of Systems  Constitutional POS: nothing reported    NEG: anorexia or night sweats   Genitourinary POS: frequency and it IS effecting her daily living    NEG: dysuria or hematuria      Gastointestinal POS: nothing reported    NEG: bloating, change in bowel habits, melena or reflux symptoms   Integument POS: nothing reported    NEG: moles that are changing in size, shape, color or rashes   Breast POS: nothing reported    NEG: persistent breast lump, skin dimpling or nipple discharge        Objective   /84   Resp 14   Wt 83.5 kg (184 lb)   Breastfeeding No   BMI 30.62 kg/m²     General:  well developed; well nourished  no acute distress   Skin:  No suspicious lesions seen   Thyroid: normal to inspection and palpation   Breasts:  Examined in supine position  Symmetric without masses or skin dimpling  Nipples normal without  inversion, lesions or discharge  There are no palpable axillary nodes  Bilateral implants are noted without obvious palpable abnormalities   Abdomen: soft, non-tender; no masses  no umbilical or inguinal hernias are present  no hepato-splenomegaly   Pelvis: Clinical staff was present for exam  External genitalia:  condyloma present Multifocally perianally;  :  urethral meatus normal;  Vaginal:  normal pink mucosa without prolapse or lesions.  Cervix:  absent.  Uterus:  absent.  Adnexa:  non palpable bilaterally.  Rectal:  digital rectal exam not performed; anus visually normal appearing.        Assessment   1. Normal GYN exam S/P CHRIS  2. SKY - affecting her activities of daily living.  3. Smoker  4. Multifocal perianal condyloma  5. History of herpes  6. She is up to date on all relevant gynecologic and colorectal screenings     Plan   1. Pap was not done today.  I explained to Shahana that the Pap smears are no longer recommended in patient's after hysterectomy.   I stressed to Shahana that she still should be seen to be seen yearly for a full physical including breast and pelvic exam.  2. She was encouraged to get yearly mammograms.  She should report any palpable breast lump(s) or skin changes regardless of mammographic findings.  I explained to Shahana that notification regarding her mammogram results will come from the center performing the study.  Our office will not be routinely calling with mammogram results.  It is her responsibility to make sure that the results from the mammogram are communicated to her by the breast center.  If she has any questions about the results, she is welcome to call our office anytime.  3. The importance of keeping all planned follow-up and taking all medications as prescribed was emphasized.  4. If after 4 months the Aldara has not eradicated the perianal condyloma, next steps would be laser ablation  5. Follow up for evaluation of mixed urinary incontinence.  Will first need  a urinary log if she wishes so we can better determine what the next asked to evaluate are.    New Medications Ordered This Visit   Medications   • valACYclovir (Valtrex) 1000 MG tablet     Sig: Take 1 tablet by mouth Daily.     Dispense:  5 tablet     Refill:  4   • imiquimod (Aldara) 5 % cream     Sig: Apply  topically to the appropriate area as directed 3 (Three) Times a Week.     Dispense:  12 packet     Refill:  3          This note was electronically signed.    Stephen Resendiz M.D.  September 25, 2020    Note: Speech recognition transcription software may have been used to create portions of this document.  An attempt at proofreading has been made but errors in transcription could still be present.

## 2020-09-27 LAB — SARS-COV-2 AB SERPL QL IA: NEGATIVE

## 2020-10-14 ENCOUNTER — HOSPITAL ENCOUNTER (OUTPATIENT)
Dept: ULTRASOUND IMAGING | Facility: HOSPITAL | Age: 50
Discharge: HOME | End: 2020-10-14

## 2020-10-14 ENCOUNTER — HOSPITAL ENCOUNTER (OUTPATIENT)
Dept: MAMMOGRAPHY | Facility: HOSPITAL | Age: 50
Discharge: HOME | End: 2020-10-14

## 2020-10-14 DIAGNOSIS — N64.4 BREAST PAIN: ICD-10-CM

## 2020-10-14 PROCEDURE — 77066 DX MAMMO INCL CAD BI: CPT | Performed by: RADIOLOGY

## 2020-10-14 PROCEDURE — G0279 TOMOSYNTHESIS, MAMMO: HCPCS

## 2020-10-14 PROCEDURE — 77066 DX MAMMO INCL CAD BI: CPT

## 2020-10-14 PROCEDURE — 76642 ULTRASOUND BREAST LIMITED: CPT

## 2020-10-14 PROCEDURE — 76642 ULTRASOUND BREAST LIMITED: CPT | Performed by: RADIOLOGY

## 2020-10-14 PROCEDURE — 77062 BREAST TOMOSYNTHESIS BI: CPT | Performed by: RADIOLOGY

## 2020-10-22 ENCOUNTER — TELEMEDICINE (OUTPATIENT)
Dept: INTERNAL MEDICINE | Facility: CLINIC | Age: 50
End: 2020-10-22

## 2020-10-22 VITALS — BODY MASS INDEX: 30.79 KG/M2 | WEIGHT: 185 LBS

## 2020-10-22 DIAGNOSIS — F32.A ANXIETY AND DEPRESSION: ICD-10-CM

## 2020-10-22 DIAGNOSIS — R29.898 WEAKNESS OF BOTH LOWER EXTREMITIES: ICD-10-CM

## 2020-10-22 DIAGNOSIS — E66.09 CLASS 1 OBESITY DUE TO EXCESS CALORIES WITH SERIOUS COMORBIDITY AND BODY MASS INDEX (BMI) OF 30.0 TO 30.9 IN ADULT: Primary | ICD-10-CM

## 2020-10-22 DIAGNOSIS — F41.9 ANXIETY AND DEPRESSION: ICD-10-CM

## 2020-10-22 PROCEDURE — 99214 OFFICE O/P EST MOD 30 MIN: CPT | Performed by: INTERNAL MEDICINE

## 2020-10-22 RX ORDER — PHENTERMINE HYDROCHLORIDE 37.5 MG/1
37.5 TABLET ORAL
Qty: 90 TABLET | Refills: 0 | OUTPATIENT
Start: 2020-10-22 | End: 2021-02-07

## 2020-10-22 NOTE — PROGRESS NOTES
Internal Medicine Follow Up    Chief Complaint  Shahana Cristobal is a 49 y.o. female who presents today for follow up of chronic medical conditions outlined below.    Chief Complaint   Patient presents with   • Follow-up     LE weakness, lumbar DDD, anxiety/depression        HPI  Ms. Cristobal was seen for follow up via video visit. She has established with Dr. Resendiz for GYN care and Dr. Dawson for her LE weakness and neuropathy. She has been prescribed valtrex daily for HSV and Aldara for genital warts. She notes Aldara does not seem to be helping so far. She is to follow up in 4 months if not improving for laser treatment. She is planned to have EMG/NCS next week and to follow up with Dr. Dawson. She reports uncertainty on cause of her LE symptoms and notes reduced left patellar reflex on exam. She was started on gabapentin which has helped her neuropathic pain. She has not had any major falls recently but still reports minor falls without injury. She continues to follow with  psychiatry. She has stopped risperdal but remains on effexor. She reports ongoing issues with weight loss. Weight today stable at 185lb. She continues exercise and diet. She would like to get back on adipex to try and give herself a boost. No hx of cardiac disease, chest pain, palpitations. She has previously taken adipex 37.5mg half tab in the AM and half tab in the PM.       Review of Systems  Review of Systems   Respiratory: Negative.    Cardiovascular: Negative.    Musculoskeletal: Positive for back pain and gait problem.   Skin: Positive for skin lesions (genital warts).   Neurological: Positive for weakness and numbness.   Psychiatric/Behavioral: Negative.         Current Medications  Current Outpatient Medications on File Prior to Visit   Medication Sig Dispense Refill   • cyclobenzaprine (FLEXERIL) 5 MG tablet Take 2 tablets by mouth 3 (Three) Times a Day As Needed for Muscle Spasms. 90 tablet 1   • gabapentin (NEURONTIN) 300  MG capsule Take 1 capsule by mouth 3 (Three) Times a Day. 90 capsule 0   • imiquimod (Aldara) 5 % cream Apply  topically to the appropriate area as directed 3 (Three) Times a Week. 12 packet 3   • rOPINIRole (REQUIP) 2 MG tablet Take 1 tablet by mouth 2 (two) times a day. 60 tablet 5   • valACYclovir (Valtrex) 1000 MG tablet Take 1 tablet by mouth Daily. 5 tablet 4   • venlafaxine XR (EFFEXOR-XR) 150 MG 24 hr capsule Take 150 mg by mouth Daily.       No current facility-administered medications on file prior to visit.        Allergies  Allergies   Allergen Reactions   • Aripiprazole Mental Status Change       Objective  There were no vitals taken for this visit.     Physical Exam  Physical Exam  Constitutional:       Appearance: Normal appearance. She is obese.   HENT:      Head: Normocephalic and atraumatic.   Eyes:      Conjunctiva/sclera: Conjunctivae normal.   Pulmonary:      Effort: Pulmonary effort is normal. No respiratory distress.   Neurological:      Mental Status: She is alert.      Comments: Speech clear and fluent, no visible facial droop   Psychiatric:         Mood and Affect: Mood normal.         Behavior: Behavior normal.         Results  Results for orders placed or performed in visit on 09/25/20   CBC (No Diff)    Specimen: Blood   Result Value Ref Range    WBC 12.06 (H) 3.40 - 10.80 10*3/mm3    RBC 4.87 3.77 - 5.28 10*6/mm3    Hemoglobin 14.3 12.0 - 15.9 g/dL    Hematocrit 43.3 34.0 - 46.6 %    MCV 88.9 79.0 - 97.0 fL    MCH 29.4 26.6 - 33.0 pg    MCHC 33.0 31.5 - 35.7 g/dL    RDW 13.1 12.3 - 15.4 %    RDW-SD 42.5 37.0 - 54.0 fl    MPV 10.4 6.0 - 12.0 fL    Platelets 379 140 - 450 10*3/mm3   Comprehensive Metabolic Panel    Specimen: Blood   Result Value Ref Range    Glucose 93 65 - 99 mg/dL    BUN 7 6 - 20 mg/dL    Creatinine 0.68 0.57 - 1.00 mg/dL    Sodium 140 136 - 145 mmol/L    Potassium 3.7 3.5 - 5.2 mmol/L    Chloride 101 98 - 107 mmol/L    CO2 27.8 22.0 - 29.0 mmol/L    Calcium 10.1 8.6 -  10.5 mg/dL    Total Protein 8.0 6.0 - 8.5 g/dL    Albumin 4.50 3.50 - 5.20 g/dL    ALT (SGPT) 16 1 - 33 U/L    AST (SGOT) 16 1 - 32 U/L    Alkaline Phosphatase 63 39 - 117 U/L    Total Bilirubin 0.2 0.0 - 1.2 mg/dL    eGFR Non African Amer 92 >60 mL/min/1.73    Globulin 3.5 gm/dL    A/G Ratio 1.3 g/dL    BUN/Creatinine Ratio 10.3 7.0 - 25.0    Anion Gap 11.2 5.0 - 15.0 mmol/L   Lipid Panel    Specimen: Blood   Result Value Ref Range    Total Cholesterol 179 0 - 200 mg/dL    Triglycerides 93 0 - 150 mg/dL    HDL Cholesterol 50 40 - 60 mg/dL    LDL Cholesterol  110 (H) 0 - 100 mg/dL    VLDL Cholesterol 18.6 5 - 40 mg/dL    LDL/HDL Ratio 2.21    Hemoglobin A1c    Specimen: Blood   Result Value Ref Range    Hemoglobin A1C 5.10 4.80 - 5.60 %   TSH Rfx On Abnormal To Free T4    Specimen: Blood   Result Value Ref Range    TSH 2.440 0.270 - 4.200 uIU/mL   Hepatitis C Antibody    Specimen: Blood   Result Value Ref Range    Hepatitis C Ab Non-Reactive Non-Reactive   SARS-CoV-2 Antibodies (Roche)    Specimen: Blood   Result Value Ref Range    SARS-COV-2 ANTIBODIES Negative Negative        Assessment and Plan  Diagnoses and all orders for this visit:    Class 1 obesity due to excess calories with serious comorbidity and body mass index (BMI) of 30.0 to 30.9 in adult  - Weight has been stable at 185lbs despite efforts to exercise and watch diet. Has done well on adipex 37.5mg (half tab twice daily) and has no contraindications to resuming this today.  - She will come by office to fill out controlled substance agreement. Plan to do adipex x3 months.    Weakness of both lower extremities  - Has established with Dr. Dawson. Clinically not suspected to be myelopathy or radiculopathy from her lumbar DDD given that it is not dermatomal. EMG/NCS pending.  - Gabapentin 300mg TID has improved her neuropathic pain.    Anxiety and depression  - Continues to follow with  psychiatry, on effexor 150mg daily. Has stopped Risperdal. Mood  stable.     Health Maintenance  - Pap smear: s/p hysterectomy  - Mammogram: 10/2020 BIRADS 1  - Colonoscopy: 9/9/2020, repeat 10y  - Lung cancer screening: Current smoker, may need at age 54  - HCV: negative  - Immunizations: Pneumovax 7/2020. Tdap 2013. Discuss shingrix next visit.  - Depression screening: negative 7/2020    Return in about 3 months (around 1/22/2021) for Follow up 30 minutes weight loss.     This patient has consented to a telehealth visit via video. The visit was scheduled to comply with patient safety concerns in accordance with CDC recommendations.  I spent 15 minutes in total including but not limited to the 15 minutes spent in direct conversation with this patient.

## 2020-11-04 ENCOUNTER — TELEPHONE (OUTPATIENT)
Dept: INTERNAL MEDICINE | Facility: CLINIC | Age: 50
End: 2020-11-04

## 2020-11-04 DIAGNOSIS — B37.31 VAGINAL CANDIDIASIS: Primary | ICD-10-CM

## 2020-11-04 RX ORDER — FLUCONAZOLE 150 MG/1
150 TABLET ORAL
Qty: 3 TABLET | Refills: 0 | OUTPATIENT
Start: 2020-11-04 | End: 2021-02-07

## 2020-11-04 NOTE — TELEPHONE ENCOUNTER
Caller: Levar Shahana L    Relationship: Self    Best call back number: [phone]    What medication are you requesting: Diflucan     What are your current symptoms: Yeast Infection     How long have you been experiencing symptoms: 3 days     Have you had these symptoms before:    [x] Yes  [] No    Have you been treated for these symptoms before:   [x] Yes  [] No    If a prescription is needed, what is your preferred pharmacy and phone number: [facility] [address] [phone] [facility] [phone]      Additional notes: Patient stated she is needing this medication called into the pharmacy. She states that she has tried over the counter medication and nothing has seemed to work. The patient states when this happens she usually has 3 or 4 called in due to her having yeast infection frequently.

## 2021-01-03 DIAGNOSIS — G25.81 RESTLESS LEGS SYNDROME: ICD-10-CM

## 2021-01-04 RX ORDER — ROPINIROLE 2 MG/1
TABLET, FILM COATED ORAL
Qty: 60 TABLET | Refills: 4 | Status: SHIPPED | OUTPATIENT
Start: 2021-01-04

## 2021-01-04 NOTE — TELEPHONE ENCOUNTER
Last Office Visit: 10/22/20 Telemedicine  Next Office Visit: 1/21/21    Labs completed in past 6 months? yes  Labs completed in past year? no    Last Refill Date: 12/8/2020  Quantity: #60  Refills: 4    Pharmacy: [facility] [address] [place] [address] [phone] [facility] [phone]

## 2021-01-13 ENCOUNTER — TELEMEDICINE (OUTPATIENT)
Dept: INTERNAL MEDICINE | Facility: CLINIC | Age: 51
End: 2021-01-13

## 2021-01-13 DIAGNOSIS — J40 BRONCHITIS: Primary | ICD-10-CM

## 2021-01-13 DIAGNOSIS — Z78.9 VEGETARIAN DIET: ICD-10-CM

## 2021-01-13 DIAGNOSIS — R05.9 COUGH: ICD-10-CM

## 2021-01-13 DIAGNOSIS — R50.9 FEVER AND CHILLS: ICD-10-CM

## 2021-01-13 DIAGNOSIS — R06.02 SHORTNESS OF BREATH: ICD-10-CM

## 2021-01-13 PROCEDURE — 99213 OFFICE O/P EST LOW 20 MIN: CPT | Performed by: INTERNAL MEDICINE

## 2021-01-13 RX ORDER — ALBUTEROL SULFATE 90 UG/1
2 AEROSOL, METERED RESPIRATORY (INHALATION) EVERY 4 HOURS PRN
Qty: 18 G | Refills: 0 | Status: SHIPPED | OUTPATIENT
Start: 2021-01-13

## 2021-01-13 RX ORDER — AZITHROMYCIN 250 MG/1
TABLET, FILM COATED ORAL
Qty: 6 TABLET | Refills: 0 | OUTPATIENT
Start: 2021-01-13 | End: 2021-02-07

## 2021-01-13 RX ORDER — PREDNISONE 20 MG/1
40 TABLET ORAL DAILY
Qty: 10 TABLET | Refills: 0 | Status: SHIPPED | OUTPATIENT
Start: 2021-01-13 | End: 2021-01-18
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
## 2021-01-14 ENCOUNTER — PRIOR AUTHORIZATION (OUTPATIENT)
Dept: INTERNAL MEDICINE | Facility: CLINIC | Age: 51
End: 2021-01-14

## 2021-01-14 PROCEDURE — U0004 COV-19 TEST NON-CDC HGH THRU: HCPCS | Performed by: INTERNAL MEDICINE

## 2021-01-15 LAB — SARS-COV-2 RNA RESP QL NAA+PROBE: NOT DETECTED

## 2021-01-27 DIAGNOSIS — M51.37 DDD (DEGENERATIVE DISC DISEASE), LUMBOSACRAL: ICD-10-CM

## 2021-01-27 NOTE — TELEPHONE ENCOUNTER
Last Office Visit: 1/13/21 Telemedicine  Next Office Visit: 7/19/21    Labs completed in past 6 months? yes  Labs completed in past year? no    Last Refill Date: 10/6/2020  Quantity: 90  Refills: 0    Pharmacy: [facility] [address] [place] [address] [phone] [facility] [phone] FX

## 2021-01-28 ENCOUNTER — HOSPITAL ENCOUNTER (EMERGENCY)
Facility: HOSPITAL | Age: 51
Discharge: HOME | End: 2021-01-28
Attending: EMERGENCY MEDICINE | Admitting: EMERGENCY MEDICINE

## 2021-01-28 ENCOUNTER — EPISODE CHANGES (OUTPATIENT)
Dept: CASE MANAGEMENT | Facility: OTHER | Age: 51
End: 2021-01-28

## 2021-01-28 VITALS
BODY MASS INDEX: 30.82 KG/M2 | HEIGHT: 65 IN | RESPIRATION RATE: 22 BRPM | TEMPERATURE: 99 F | OXYGEN SATURATION: 95 % | WEIGHT: 185 LBS | DIASTOLIC BLOOD PRESSURE: 85 MMHG | SYSTOLIC BLOOD PRESSURE: 124 MMHG | HEART RATE: 82 BPM

## 2021-01-28 DIAGNOSIS — E86.0 DEHYDRATION: ICD-10-CM

## 2021-01-28 DIAGNOSIS — N39.0 URINARY TRACT INFECTION WITHOUT HEMATURIA, SITE UNSPECIFIED: Primary | ICD-10-CM

## 2021-01-28 DIAGNOSIS — R11.2 INTRACTABLE VOMITING WITH NAUSEA, UNSPECIFIED VOMITING TYPE: ICD-10-CM

## 2021-01-28 DIAGNOSIS — M06.9 RHEUMATOID ARTHRITIS FLARE (HCC): ICD-10-CM

## 2021-01-28 LAB
ALBUMIN SERPL-MCNC: 4.5 G/DL (ref 3.5–5.2)
ALBUMIN/GLOB SERPL: 1.4 G/DL
ALP SERPL-CCNC: 81 U/L (ref 39–117)
ALT SERPL W P-5'-P-CCNC: 18 U/L (ref 1–33)
ANION GAP SERPL CALCULATED.3IONS-SCNC: 15 MMOL/L (ref 5–15)
AST SERPL-CCNC: 19 U/L (ref 1–32)
BACTERIA UR QL AUTO: ABNORMAL /HPF
BASOPHILS # BLD AUTO: 0.07 10*3/MM3 (ref 0–0.2)
BASOPHILS NFR BLD AUTO: 0.5 % (ref 0–1.5)
BILIRUB SERPL-MCNC: <0.2 MG/DL (ref 0–1.2)
BILIRUB UR QL STRIP: NEGATIVE
BUN SERPL-MCNC: 17 MG/DL (ref 6–20)
BUN/CREAT SERPL: 23 (ref 7–25)
CALCIUM SPEC-SCNC: 9.5 MG/DL (ref 8.6–10.5)
CHLORIDE SERPL-SCNC: 100 MMOL/L (ref 98–107)
CLARITY UR: ABNORMAL
CO2 SERPL-SCNC: 24 MMOL/L (ref 22–29)
COLOR UR: YELLOW
CREAT SERPL-MCNC: 0.74 MG/DL (ref 0.57–1)
D-LACTATE SERPL-SCNC: 1.3 MMOL/L (ref 0.5–2)
DEPRECATED RDW RBC AUTO: 43.5 FL (ref 37–54)
EOSINOPHIL # BLD AUTO: 0.16 10*3/MM3 (ref 0–0.4)
EOSINOPHIL NFR BLD AUTO: 1.2 % (ref 0.3–6.2)
ERYTHROCYTE [DISTWIDTH] IN BLOOD BY AUTOMATED COUNT: 14.2 % (ref 12.3–15.4)
GFR SERPL CREATININE-BSD FRML MDRD: 83 ML/MIN/1.73
GLOBULIN UR ELPH-MCNC: 3.3 GM/DL
GLUCOSE SERPL-MCNC: 118 MG/DL (ref 65–99)
GLUCOSE UR STRIP-MCNC: NEGATIVE MG/DL
HCT VFR BLD AUTO: 41.9 % (ref 34–46.6)
HGB BLD-MCNC: 13.9 G/DL (ref 12–15.9)
HGB UR QL STRIP.AUTO: ABNORMAL
HOLD SPECIMEN: NORMAL
HOLD SPECIMEN: NORMAL
HYALINE CASTS UR QL AUTO: ABNORMAL /LPF
IMM GRANULOCYTES # BLD AUTO: 0.06 10*3/MM3 (ref 0–0.05)
IMM GRANULOCYTES NFR BLD AUTO: 0.4 % (ref 0–0.5)
KETONES UR QL STRIP: ABNORMAL
LEUKOCYTE ESTERASE UR QL STRIP.AUTO: ABNORMAL
LIPASE SERPL-CCNC: 41 U/L (ref 13–60)
LYMPHOCYTES # BLD AUTO: 4.35 10*3/MM3 (ref 0.7–3.1)
LYMPHOCYTES NFR BLD AUTO: 31.6 % (ref 19.6–45.3)
MCH RBC QN AUTO: 28.1 PG (ref 26.6–33)
MCHC RBC AUTO-ENTMCNC: 33.2 G/DL (ref 31.5–35.7)
MCV RBC AUTO: 84.6 FL (ref 79–97)
MONOCYTES # BLD AUTO: 0.61 10*3/MM3 (ref 0.1–0.9)
MONOCYTES NFR BLD AUTO: 4.4 % (ref 5–12)
NEUTROPHILS NFR BLD AUTO: 61.9 % (ref 42.7–76)
NEUTROPHILS NFR BLD AUTO: 8.5 10*3/MM3 (ref 1.7–7)
NITRITE UR QL STRIP: NEGATIVE
NRBC BLD AUTO-RTO: 0 /100 WBC (ref 0–0.2)
PH UR STRIP.AUTO: <=5 [PH] (ref 5–8)
PLATELET # BLD AUTO: 336 10*3/MM3 (ref 140–450)
PMV BLD AUTO: 10 FL (ref 6–12)
POTASSIUM SERPL-SCNC: 4.1 MMOL/L (ref 3.5–5.2)
PROCALCITONIN SERPL-MCNC: <0.02 NG/ML (ref 0–0.25)
PROT SERPL-MCNC: 7.8 G/DL (ref 6–8.5)
PROT UR QL STRIP: ABNORMAL
RBC # BLD AUTO: 4.95 10*6/MM3 (ref 3.77–5.28)
RBC # UR: ABNORMAL /HPF
REF LAB TEST METHOD: ABNORMAL
SODIUM SERPL-SCNC: 139 MMOL/L (ref 136–145)
SP GR UR STRIP: 1.02 (ref 1–1.03)
SQUAMOUS #/AREA URNS HPF: ABNORMAL /HPF
UROBILINOGEN UR QL STRIP: ABNORMAL
WBC # BLD AUTO: 13.75 10*3/MM3 (ref 3.4–10.8)
WBC UR QL AUTO: ABNORMAL /HPF
WHOLE BLOOD HOLD SPECIMEN: NORMAL
WHOLE BLOOD HOLD SPECIMEN: NORMAL

## 2021-01-28 PROCEDURE — 96375 TX/PRO/DX INJ NEW DRUG ADDON: CPT

## 2021-01-28 PROCEDURE — 87040 BLOOD CULTURE FOR BACTERIA: CPT | Performed by: PHYSICIAN ASSISTANT

## 2021-01-28 PROCEDURE — 83605 ASSAY OF LACTIC ACID: CPT | Performed by: PHYSICIAN ASSISTANT

## 2021-01-28 PROCEDURE — 99283 EMERGENCY DEPT VISIT LOW MDM: CPT

## 2021-01-28 PROCEDURE — 81001 URINALYSIS AUTO W/SCOPE: CPT | Performed by: PHYSICIAN ASSISTANT

## 2021-01-28 PROCEDURE — 83690 ASSAY OF LIPASE: CPT | Performed by: PHYSICIAN ASSISTANT

## 2021-01-28 PROCEDURE — 25010000002 ONDANSETRON PER 1 MG: Performed by: PHYSICIAN ASSISTANT

## 2021-01-28 PROCEDURE — 25010000002 HYDROMORPHONE PER 4 MG: Performed by: EMERGENCY MEDICINE

## 2021-01-28 PROCEDURE — 25010000002 CEFTRIAXONE PER 250 MG: Performed by: PHYSICIAN ASSISTANT

## 2021-01-28 PROCEDURE — 87086 URINE CULTURE/COLONY COUNT: CPT | Performed by: PHYSICIAN ASSISTANT

## 2021-01-28 PROCEDURE — 80053 COMPREHEN METABOLIC PANEL: CPT | Performed by: PHYSICIAN ASSISTANT

## 2021-01-28 PROCEDURE — 85025 COMPLETE CBC W/AUTO DIFF WBC: CPT | Performed by: PHYSICIAN ASSISTANT

## 2021-01-28 PROCEDURE — 84145 PROCALCITONIN (PCT): CPT | Performed by: PHYSICIAN ASSISTANT

## 2021-01-28 PROCEDURE — 96365 THER/PROPH/DIAG IV INF INIT: CPT

## 2021-01-28 PROCEDURE — 96376 TX/PRO/DX INJ SAME DRUG ADON: CPT

## 2021-01-28 PROCEDURE — 25010000002 METHYLPREDNISOLONE PER 125 MG: Performed by: PHYSICIAN ASSISTANT

## 2021-01-28 PROCEDURE — 25010000002 KETOROLAC TROMETHAMINE PER 15 MG: Performed by: PHYSICIAN ASSISTANT

## 2021-01-28 RX ORDER — ONDANSETRON 4 MG/1
4 TABLET, FILM COATED ORAL EVERY 8 HOURS PRN
Qty: 20 TABLET | Refills: 0 | OUTPATIENT
Start: 2021-01-28 | End: 2021-02-07

## 2021-01-28 RX ORDER — CYCLOBENZAPRINE HCL 5 MG
TABLET ORAL
Qty: 90 TABLET | Refills: 1 | OUTPATIENT
Start: 2021-01-28 | End: 2021-02-07

## 2021-01-28 RX ORDER — CEFDINIR 300 MG/1
300 CAPSULE ORAL 2 TIMES DAILY
Qty: 14 CAPSULE | Refills: 0 | OUTPATIENT
Start: 2021-01-28 | End: 2021-02-07

## 2021-01-28 RX ORDER — KETOROLAC TROMETHAMINE 15 MG/ML
15 INJECTION, SOLUTION INTRAMUSCULAR; INTRAVENOUS ONCE
Status: COMPLETED | OUTPATIENT
Start: 2021-01-28 | End: 2021-01-28

## 2021-01-28 RX ORDER — ONDANSETRON 2 MG/ML
4 INJECTION INTRAMUSCULAR; INTRAVENOUS ONCE
Status: COMPLETED | OUTPATIENT
Start: 2021-01-28 | End: 2021-01-28

## 2021-01-28 RX ORDER — METHYLPREDNISOLONE SODIUM SUCCINATE 125 MG/2ML
125 INJECTION, POWDER, LYOPHILIZED, FOR SOLUTION INTRAMUSCULAR; INTRAVENOUS ONCE
Status: COMPLETED | OUTPATIENT
Start: 2021-01-28 | End: 2021-01-28

## 2021-01-28 RX ORDER — ONDANSETRON 2 MG/ML
4 INJECTION INTRAMUSCULAR; INTRAVENOUS ONCE
Status: DISCONTINUED | OUTPATIENT
Start: 2021-01-28 | End: 2021-01-28

## 2021-01-28 RX ORDER — HYDROMORPHONE HYDROCHLORIDE 1 MG/ML
0.5 INJECTION, SOLUTION INTRAMUSCULAR; INTRAVENOUS; SUBCUTANEOUS
Status: DISCONTINUED | OUTPATIENT
Start: 2021-01-28 | End: 2021-01-28 | Stop reason: HOSPADM

## 2021-01-28 RX ADMIN — HYDROMORPHONE HYDROCHLORIDE 0.5 MG: 1 INJECTION, SOLUTION INTRAMUSCULAR; INTRAVENOUS; SUBCUTANEOUS at 08:57

## 2021-01-28 RX ADMIN — ONDANSETRON 4 MG: 2 INJECTION INTRAMUSCULAR; INTRAVENOUS at 07:42

## 2021-01-28 RX ADMIN — SODIUM CHLORIDE 1 G: 900 INJECTION INTRAVENOUS at 09:10

## 2021-01-28 RX ADMIN — SODIUM CHLORIDE 1000 ML: 9 INJECTION, SOLUTION INTRAVENOUS at 07:57

## 2021-01-28 RX ADMIN — METHYLPREDNISOLONE SODIUM SUCCINATE 125 MG: 125 INJECTION, POWDER, FOR SOLUTION INTRAMUSCULAR; INTRAVENOUS at 07:42

## 2021-01-28 RX ADMIN — KETOROLAC TROMETHAMINE 15 MG: 15 INJECTION, SOLUTION INTRAMUSCULAR; INTRAVENOUS at 07:42

## 2021-01-28 RX ADMIN — HYDROMORPHONE HYDROCHLORIDE 0.5 MG: 1 INJECTION, SOLUTION INTRAMUSCULAR; INTRAVENOUS; SUBCUTANEOUS at 07:53

## 2021-01-29 ENCOUNTER — PATIENT OUTREACH (OUTPATIENT)
Dept: CASE MANAGEMENT | Facility: OTHER | Age: 51
End: 2021-01-29

## 2021-01-29 LAB — BACTERIA SPEC AEROBE CULT: NORMAL

## 2021-01-29 NOTE — OUTREACH NOTE
"Patient Outreach Note    Pt contacted regarding ED visit 1/28/21 with chief c/o arthritis, nausea.  Pt states  \"I'm good today.  I feel much better. \"  Was on her way to  her prescriptions. Antibiotic prescribed for UTI.  Knows to take as directed and complete course. She is trying to drink better and states she has her big jug of water she is working on.  Discussed if having difficulty with fluids to try ice chips, popcycles, diluted juice.  States nausea is much better today and not having trouble.  She lives alone, but states her children check in on her, however they live out of state.  She moved to [place] approx 1 year ago, and then pandemic hit.  Has made friends with neighbor that she states she could depend on.  She denies transportation issues, although her car is in shop at present.  States will be fixed shortly and has \"friend that come down from [place] today to drive me around for my errands.\"  She denies any food insecurities.  AVS reviewed.  She is active on [employer].  She does not have Living will, but \[facility]\"" probably should do this. Will send materials on ACP. Role of  explained and contact number given.  [facility] 24/7 Nurse line explained and contact number provided.  She denies any needs and voiced her appreciation for the call.  \"That was so nice.\"    Ninfa Lucero RN  Ambulatory     1/29/2021, 10:51 EST      "

## 2021-02-02 LAB
BACTERIA SPEC AEROBE CULT: NORMAL
BACTERIA SPEC AEROBE CULT: NORMAL

## 2021-02-07 ENCOUNTER — APPOINTMENT (OUTPATIENT)
Dept: CARDIOLOGY | Facility: HOSPITAL | Age: 51
End: 2021-02-07

## 2021-02-07 ENCOUNTER — HOSPITAL ENCOUNTER (EMERGENCY)
Facility: HOSPITAL | Age: 51
Discharge: HOME | End: 2021-02-07
Attending: EMERGENCY MEDICINE | Admitting: EMERGENCY MEDICINE

## 2021-02-07 VITALS
WEIGHT: 185 LBS | RESPIRATION RATE: 20 BRPM | TEMPERATURE: 97.8 F | OXYGEN SATURATION: 95 % | SYSTOLIC BLOOD PRESSURE: 123 MMHG | BODY MASS INDEX: 29.73 KG/M2 | HEIGHT: 66 IN | HEART RATE: 93 BPM | DIASTOLIC BLOOD PRESSURE: 72 MMHG

## 2021-02-07 DIAGNOSIS — M62.830 LUMBAR PARASPINAL MUSCLE SPASM: ICD-10-CM

## 2021-02-07 DIAGNOSIS — M54.31 RIGHT SIDED SCIATICA: ICD-10-CM

## 2021-02-07 DIAGNOSIS — M79.604 ACUTE PAIN OF RIGHT LOWER EXTREMITY: Primary | ICD-10-CM

## 2021-02-07 LAB
ALBUMIN SERPL-MCNC: 4.2 G/DL (ref 3.5–5.2)
ALBUMIN/GLOB SERPL: 1.2 G/DL
ALP SERPL-CCNC: 68 U/L (ref 39–117)
ALT SERPL W P-5'-P-CCNC: 18 U/L (ref 1–33)
ANION GAP SERPL CALCULATED.3IONS-SCNC: 10 MMOL/L (ref 5–15)
AST SERPL-CCNC: 19 U/L (ref 1–32)
BACTERIA UR QL AUTO: ABNORMAL /HPF
BASOPHILS # BLD AUTO: 0.04 10*3/MM3 (ref 0–0.2)
BASOPHILS NFR BLD AUTO: 0.3 % (ref 0–1.5)
BH CV LOWER VASCULAR LEFT COMMON FEMORAL AUGMENT: NORMAL
BH CV LOWER VASCULAR LEFT COMMON FEMORAL COMPRESS: NORMAL
BH CV LOWER VASCULAR LEFT COMMON FEMORAL PHASIC: NORMAL
BH CV LOWER VASCULAR LEFT COMMON FEMORAL SPONT: NORMAL
BH CV LOWER VASCULAR RIGHT COMMON FEMORAL AUGMENT: NORMAL
BH CV LOWER VASCULAR RIGHT COMMON FEMORAL COMPRESS: NORMAL
BH CV LOWER VASCULAR RIGHT COMMON FEMORAL PHASIC: NORMAL
BH CV LOWER VASCULAR RIGHT COMMON FEMORAL SPONT: NORMAL
BH CV LOWER VASCULAR RIGHT DISTAL FEMORAL COMPRESS: NORMAL
BH CV LOWER VASCULAR RIGHT GASTRONEMIUS COMPRESS: NORMAL
BH CV LOWER VASCULAR RIGHT GREATER SAPH AK COMPRESS: NORMAL
BH CV LOWER VASCULAR RIGHT GREATER SAPH BK COMPRESS: NORMAL
BH CV LOWER VASCULAR RIGHT LESSER SAPH COMPRESS: NORMAL
BH CV LOWER VASCULAR RIGHT MID FEMORAL AUGMENT: NORMAL
BH CV LOWER VASCULAR RIGHT MID FEMORAL COMPRESS: NORMAL
BH CV LOWER VASCULAR RIGHT MID FEMORAL PHASIC: NORMAL
BH CV LOWER VASCULAR RIGHT MID FEMORAL SPONT: NORMAL
BH CV LOWER VASCULAR RIGHT PERONEAL COMPRESS: NORMAL
BH CV LOWER VASCULAR RIGHT POPLITEAL AUGMENT: NORMAL
BH CV LOWER VASCULAR RIGHT POPLITEAL COMPRESS: NORMAL
BH CV LOWER VASCULAR RIGHT POPLITEAL PHASIC: NORMAL
BH CV LOWER VASCULAR RIGHT POPLITEAL SPONT: NORMAL
BH CV LOWER VASCULAR RIGHT POSTERIOR TIBIAL COMPRESS: NORMAL
BH CV LOWER VASCULAR RIGHT PROFUNDA FEMORAL COMPRESS: NORMAL
BH CV LOWER VASCULAR RIGHT PROXIMAL FEMORAL COMPRESS: NORMAL
BH CV LOWER VASCULAR RIGHT SAPHENOFEMORAL JUNCTION COMPRESS: NORMAL
BILIRUB SERPL-MCNC: 0.2 MG/DL (ref 0–1.2)
BILIRUB UR QL STRIP: NEGATIVE
BUN SERPL-MCNC: 18 MG/DL (ref 6–20)
BUN/CREAT SERPL: 27.3 (ref 7–25)
CALCIUM SPEC-SCNC: 9.5 MG/DL (ref 8.6–10.5)
CHLORIDE SERPL-SCNC: 101 MMOL/L (ref 98–107)
CK SERPL-CCNC: 67 U/L (ref 20–180)
CLARITY UR: ABNORMAL
CO2 SERPL-SCNC: 23 MMOL/L (ref 22–29)
COD CRY URNS QL: ABNORMAL /HPF
COLOR UR: YELLOW
CREAT SERPL-MCNC: 0.66 MG/DL (ref 0.57–1)
CRP SERPL-MCNC: 0.6 MG/DL (ref 0–0.5)
D-LACTATE SERPL-SCNC: 1.9 MMOL/L (ref 0.5–2)
DEPRECATED RDW RBC AUTO: 45.9 FL (ref 37–54)
EOSINOPHIL # BLD AUTO: 0.17 10*3/MM3 (ref 0–0.4)
EOSINOPHIL NFR BLD AUTO: 1.3 % (ref 0.3–6.2)
ERYTHROCYTE [DISTWIDTH] IN BLOOD BY AUTOMATED COUNT: 14.3 % (ref 12.3–15.4)
ERYTHROCYTE [SEDIMENTATION RATE] IN BLOOD: 32 MM/HR (ref 0–30)
GFR SERPL CREATININE-BSD FRML MDRD: 94 ML/MIN/1.73
GLOBULIN UR ELPH-MCNC: 3.6 GM/DL
GLUCOSE SERPL-MCNC: 110 MG/DL (ref 65–99)
GLUCOSE UR STRIP-MCNC: NEGATIVE MG/DL
HCT VFR BLD AUTO: 45.7 % (ref 34–46.6)
HGB BLD-MCNC: 14.5 G/DL (ref 12–15.9)
HGB UR QL STRIP.AUTO: ABNORMAL
HYALINE CASTS UR QL AUTO: ABNORMAL /LPF
IMM GRANULOCYTES # BLD AUTO: 0.05 10*3/MM3 (ref 0–0.05)
IMM GRANULOCYTES NFR BLD AUTO: 0.4 % (ref 0–0.5)
KETONES UR QL STRIP: NEGATIVE
LEUKOCYTE ESTERASE UR QL STRIP.AUTO: ABNORMAL
LYMPHOCYTES # BLD AUTO: 1.57 10*3/MM3 (ref 0.7–3.1)
LYMPHOCYTES NFR BLD AUTO: 12.2 % (ref 19.6–45.3)
MCH RBC QN AUTO: 28.3 PG (ref 26.6–33)
MCHC RBC AUTO-ENTMCNC: 31.7 G/DL (ref 31.5–35.7)
MCV RBC AUTO: 89.1 FL (ref 79–97)
MONOCYTES # BLD AUTO: 0.54 10*3/MM3 (ref 0.1–0.9)
MONOCYTES NFR BLD AUTO: 4.2 % (ref 5–12)
MYOGLOBIN SERPL-MCNC: 21 NG/ML (ref 25–58)
NEUTROPHILS NFR BLD AUTO: 10.54 10*3/MM3 (ref 1.7–7)
NEUTROPHILS NFR BLD AUTO: 81.6 % (ref 42.7–76)
NITRITE UR QL STRIP: NEGATIVE
NRBC BLD AUTO-RTO: 0 /100 WBC (ref 0–0.2)
PH UR STRIP.AUTO: 5.5 [PH] (ref 5–8)
PLATELET # BLD AUTO: 414 10*3/MM3 (ref 140–450)
PMV BLD AUTO: 9 FL (ref 6–12)
POTASSIUM SERPL-SCNC: 4.2 MMOL/L (ref 3.5–5.2)
PROT SERPL-MCNC: 7.8 G/DL (ref 6–8.5)
PROT UR QL STRIP: NEGATIVE
RBC # BLD AUTO: 5.13 10*6/MM3 (ref 3.77–5.28)
RBC # UR: ABNORMAL /HPF
REF LAB TEST METHOD: ABNORMAL
SODIUM SERPL-SCNC: 134 MMOL/L (ref 136–145)
SP GR UR STRIP: 1.02 (ref 1–1.03)
SQUAMOUS #/AREA URNS HPF: ABNORMAL /HPF
UROBILINOGEN UR QL STRIP: ABNORMAL
WBC # BLD AUTO: 12.91 10*3/MM3 (ref 3.4–10.8)
WBC UR QL AUTO: ABNORMAL /HPF

## 2021-02-07 PROCEDURE — 81001 URINALYSIS AUTO W/SCOPE: CPT | Performed by: EMERGENCY MEDICINE

## 2021-02-07 PROCEDURE — 83605 ASSAY OF LACTIC ACID: CPT | Performed by: EMERGENCY MEDICINE

## 2021-02-07 PROCEDURE — 25010000002 METHYLPREDNISOLONE PER 125 MG: Performed by: EMERGENCY MEDICINE

## 2021-02-07 PROCEDURE — 83874 ASSAY OF MYOGLOBIN: CPT | Performed by: EMERGENCY MEDICINE

## 2021-02-07 PROCEDURE — 25010000002 HYDROMORPHONE PER 4 MG: Performed by: EMERGENCY MEDICINE

## 2021-02-07 PROCEDURE — 96374 THER/PROPH/DIAG INJ IV PUSH: CPT

## 2021-02-07 PROCEDURE — 93971 EXTREMITY STUDY: CPT

## 2021-02-07 PROCEDURE — 96376 TX/PRO/DX INJ SAME DRUG ADON: CPT

## 2021-02-07 PROCEDURE — 82550 ASSAY OF CK (CPK): CPT | Performed by: EMERGENCY MEDICINE

## 2021-02-07 PROCEDURE — 99285 EMERGENCY DEPT VISIT HI MDM: CPT

## 2021-02-07 PROCEDURE — 93971 EXTREMITY STUDY: CPT | Performed by: INTERNAL MEDICINE

## 2021-02-07 PROCEDURE — 85652 RBC SED RATE AUTOMATED: CPT | Performed by: EMERGENCY MEDICINE

## 2021-02-07 PROCEDURE — 86140 C-REACTIVE PROTEIN: CPT | Performed by: EMERGENCY MEDICINE

## 2021-02-07 PROCEDURE — 85025 COMPLETE CBC W/AUTO DIFF WBC: CPT | Performed by: EMERGENCY MEDICINE

## 2021-02-07 PROCEDURE — 25010000002 ONDANSETRON PER 1 MG: Performed by: EMERGENCY MEDICINE

## 2021-02-07 PROCEDURE — 80053 COMPREHEN METABOLIC PANEL: CPT | Performed by: EMERGENCY MEDICINE

## 2021-02-07 PROCEDURE — 96375 TX/PRO/DX INJ NEW DRUG ADDON: CPT

## 2021-02-07 PROCEDURE — 25010000002 HYDROMORPHONE 1 MG/ML SOLUTION: Performed by: EMERGENCY MEDICINE

## 2021-02-07 PROCEDURE — 25010000002 LORAZEPAM PER 2 MG: Performed by: EMERGENCY MEDICINE

## 2021-02-07 RX ORDER — METHYLPREDNISOLONE SODIUM SUCCINATE 125 MG/2ML
125 INJECTION, POWDER, LYOPHILIZED, FOR SOLUTION INTRAMUSCULAR; INTRAVENOUS ONCE
Status: COMPLETED | OUTPATIENT
Start: 2021-02-07 | End: 2021-02-07

## 2021-02-07 RX ORDER — LORAZEPAM 2 MG/ML
1 INJECTION INTRAMUSCULAR ONCE
Status: COMPLETED | OUTPATIENT
Start: 2021-02-07 | End: 2021-02-07

## 2021-02-07 RX ORDER — PREDNISONE 20 MG/1
TABLET ORAL
Qty: 10 TABLET | Refills: 0 | OUTPATIENT
Start: 2021-02-07 | End: 2021-02-16

## 2021-02-07 RX ORDER — ONDANSETRON 2 MG/ML
4 INJECTION INTRAMUSCULAR; INTRAVENOUS ONCE
Status: COMPLETED | OUTPATIENT
Start: 2021-02-07 | End: 2021-02-07

## 2021-02-07 RX ORDER — CYCLOBENZAPRINE HCL 10 MG
10 TABLET ORAL 3 TIMES DAILY PRN
Qty: 15 TABLET | Refills: 0 | OUTPATIENT
Start: 2021-02-07 | End: 2021-02-16

## 2021-02-07 RX ORDER — OXYCODONE AND ACETAMINOPHEN 7.5; 325 MG/1; MG/1
1 TABLET ORAL EVERY 6 HOURS PRN
Qty: 8 TABLET | Refills: 0 | OUTPATIENT
Start: 2021-02-07 | End: 2021-02-16

## 2021-02-07 RX ORDER — HYDROMORPHONE HYDROCHLORIDE 1 MG/ML
0.5 INJECTION, SOLUTION INTRAMUSCULAR; INTRAVENOUS; SUBCUTANEOUS ONCE
Status: COMPLETED | OUTPATIENT
Start: 2021-02-07 | End: 2021-02-07

## 2021-02-07 RX ADMIN — ONDANSETRON 4 MG: 2 INJECTION INTRAMUSCULAR; INTRAVENOUS at 08:10

## 2021-02-07 RX ADMIN — METHYLPREDNISOLONE SODIUM SUCCINATE 125 MG: 125 INJECTION, POWDER, FOR SOLUTION INTRAMUSCULAR; INTRAVENOUS at 08:06

## 2021-02-07 RX ADMIN — HYDROMORPHONE HYDROCHLORIDE 0.5 MG: 1 INJECTION, SOLUTION INTRAMUSCULAR; INTRAVENOUS; SUBCUTANEOUS at 09:15

## 2021-02-07 RX ADMIN — LORAZEPAM 1 MG: 2 INJECTION INTRAMUSCULAR; INTRAVENOUS at 09:14

## 2021-02-07 RX ADMIN — HYDROMORPHONE HYDROCHLORIDE 1 MG: 1 INJECTION, SOLUTION INTRAMUSCULAR; INTRAVENOUS; SUBCUTANEOUS at 08:05
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
## 2021-02-07 NOTE — DISCHARGE INSTRUCTIONS
Do not drive on the medicines I have prescribed as they will make you sleepy.  Try to remain as active as possible.  Call your primary care provider in the morning to arrange close follow-up.

## 2021-02-08 ENCOUNTER — PATIENT OUTREACH (OUTPATIENT)
Dept: CASE MANAGEMENT | Facility: OTHER | Age: 51
End: 2021-02-08

## 2021-02-08 NOTE — OUTREACH NOTE
"Patient Outreach Note    Pt contacted regarding ED visit 2/7/21 with chief c/o leg pain.  States \"I am better today.  They gave me pain medicine and steroids again.\"  She has obtained and is taking as directed.  Discussed not driving while taking narcotic pain med.  She does states still has a \"red dry patch looking rash on leg, but I have cortisone cream that I will put on it.\"  She denies any pain in rash area.  Offered to assist in scheduling ED f/u appt with PCP, Dr. Garrett, however she states she will call the office for appt.  She needs to find out her son's work schedule.  He has her car at present working in [place].  Asked if she needed anything, which she states yes, I am having trouble paying my rent and utilities.  Community resources provided.  [employer], [employer], [employer] [place], [employer] and [employer].  Asked if she is working at present, which she is not.  Moved to [place] approx 1 year ago and then pandemic hit.  Will have our SWMariela send out the booklet on community resources and job opportunities.  Encouraged her to call RN-ACM if she would like to speak to SW.  She voiced her appreciation for the call and all the numbers.  Will f/u in approx 2 weeks to check on needs.     Ninfa Lucero RN  Ambulatory     2/8/2021, 17:07 EST      "

## 2021-02-16 ENCOUNTER — APPOINTMENT (OUTPATIENT)
Dept: GENERAL RADIOLOGY | Facility: HOSPITAL | Age: 51
End: 2021-02-16

## 2021-02-16 ENCOUNTER — HOSPITAL ENCOUNTER (EMERGENCY)
Facility: HOSPITAL | Age: 51
Discharge: HOME | End: 2021-02-16
Attending: EMERGENCY MEDICINE | Admitting: EMERGENCY MEDICINE

## 2021-02-16 VITALS
RESPIRATION RATE: 18 BRPM | HEART RATE: 90 BPM | WEIGHT: 185 LBS | DIASTOLIC BLOOD PRESSURE: 69 MMHG | OXYGEN SATURATION: 93 % | SYSTOLIC BLOOD PRESSURE: 129 MMHG | HEIGHT: 66 IN | BODY MASS INDEX: 29.73 KG/M2 | TEMPERATURE: 98.4 F

## 2021-02-16 DIAGNOSIS — M54.31 SCIATICA OF RIGHT SIDE: ICD-10-CM

## 2021-02-16 DIAGNOSIS — M51.36 DDD (DEGENERATIVE DISC DISEASE), LUMBAR: Primary | ICD-10-CM

## 2021-02-16 PROCEDURE — 72100 X-RAY EXAM L-S SPINE 2/3 VWS: CPT

## 2021-02-16 PROCEDURE — 96372 THER/PROPH/DIAG INJ SC/IM: CPT

## 2021-02-16 PROCEDURE — 25010000002 KETOROLAC TROMETHAMINE PER 15 MG: Performed by: EMERGENCY MEDICINE

## 2021-02-16 PROCEDURE — 99284 EMERGENCY DEPT VISIT MOD MDM: CPT

## 2021-02-16 PROCEDURE — 73502 X-RAY EXAM HIP UNI 2-3 VIEWS: CPT

## 2021-02-16 RX ORDER — KETOROLAC TROMETHAMINE 30 MG/ML
30 INJECTION, SOLUTION INTRAMUSCULAR; INTRAVENOUS ONCE
Status: COMPLETED | OUTPATIENT
Start: 2021-02-16 | End: 2021-02-16

## 2021-02-16 RX ORDER — CYCLOBENZAPRINE HCL 10 MG
10 TABLET ORAL 3 TIMES DAILY PRN
Qty: 15 TABLET | Refills: 0 | Status: SHIPPED | OUTPATIENT
Start: 2021-02-16 | End: 2021-02-26 | Stop reason: SDUPTHER

## 2021-02-16 RX ORDER — DIAZEPAM 5 MG/1
10 TABLET ORAL ONCE
Status: COMPLETED | OUTPATIENT
Start: 2021-02-16 | End: 2021-02-16

## 2021-02-16 RX ORDER — IBUPROFEN 800 MG/1
800 TABLET ORAL
Qty: 15 TABLET | Refills: 0 | Status: SHIPPED | OUTPATIENT
Start: 2021-02-16 | End: 2021-02-26

## 2021-02-16 RX ADMIN — KETOROLAC TROMETHAMINE 30 MG: 30 INJECTION, SOLUTION INTRAMUSCULAR; INTRAVENOUS at 02:11

## 2021-02-16 RX ADMIN — DIAZEPAM 10 MG: 5 TABLET ORAL at 02:12

## 2021-02-16 NOTE — DISCHARGE INSTRUCTIONS
Take meds as ordered.  Follow up with your primary care physician.  If pain continues she will need an MRI of your spine.
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
## 2021-02-24 ENCOUNTER — TELEMEDICINE (OUTPATIENT)
Dept: INTERNAL MEDICINE | Facility: CLINIC | Age: 51
End: 2021-02-24

## 2021-02-24 DIAGNOSIS — M54.50 LOW BACK PAIN, UNSPECIFIED BACK PAIN LATERALITY, UNSPECIFIED CHRONICITY, UNSPECIFIED WHETHER SCIATICA PRESENT: ICD-10-CM

## 2021-02-24 DIAGNOSIS — R29.898 WEAKNESS OF BOTH LOWER EXTREMITIES: ICD-10-CM

## 2021-02-24 DIAGNOSIS — G89.29 CHRONIC RIGHT-SIDED LOW BACK PAIN WITH RIGHT-SIDED SCIATICA: Primary | ICD-10-CM

## 2021-02-24 DIAGNOSIS — M54.41 CHRONIC RIGHT-SIDED LOW BACK PAIN WITH RIGHT-SIDED SCIATICA: Primary | ICD-10-CM

## 2021-02-24 PROCEDURE — 99213 OFFICE O/P EST LOW 20 MIN: CPT | Performed by: INTERNAL MEDICINE

## 2021-02-24 RX ORDER — METHYLPREDNISOLONE 4 MG/1
TABLET ORAL
Qty: 21 TABLET | Refills: 0 | Status: SHIPPED | OUTPATIENT
Start: 2021-02-24 | End: 2021-03-01 | Stop reason: HOSPADM

## 2021-02-24 RX ORDER — GABAPENTIN 300 MG/1
300 CAPSULE ORAL 3 TIMES DAILY
Refills: 0
Start: 2021-02-24

## 2021-02-24 RX ORDER — GABAPENTIN 300 MG/1
300 CAPSULE ORAL 3 TIMES DAILY
Qty: 90 CAPSULE | Refills: 0 | Status: CANCELLED | OUTPATIENT
Start: 2021-02-24

## 2021-02-24 NOTE — TELEPHONE ENCOUNTER
Medication pended- This hasn't been prescribed since September 2020. Can we make pt a f/u appt with KAREEM?     She seen PCP today via telephone, they want her to f/u with Dr. Dawson.

## 2021-02-24 NOTE — TELEPHONE ENCOUNTER
Caller: KAMLESH SMITH    Relationship: PT    Best call back number: [phone]    Medication needed:     GABAPENTIN 300 MG 3X PER DAY    When do you need the refill by: TODAY    What details did the patient provide when requesting the medication: PT IS OUT OF THE MEDICATION AND STATES SHE IS HAVING A BAD PAIN FLARE UP    Does the patient have less than a 3 day supply:  [x] Yes  [] No    What is the patient's preferred pharmacy:      [facility] [phone]   [phone]

## 2021-02-24 NOTE — TELEPHONE ENCOUNTER
Provider:  Samir  Caller: PT  [facility]  Phone #:  [phone]  Surgery:  -------  Surgery Date:  ----------  Last visit:   9/9/20 (no showed 10/28/20 appt)  Next visit: -----    BISMARK:         09/17/2020 Gabapentin 300MG 1969 90 30 ANDERSON ANA [facility] 1    10/25/2020 Phentermine Hcl 37.5MG 1969 90 90 JIMMY OSMAN [facility] 1    02/07/2021 Oxycodone/Acetaminophen  325MG/7.5MG  1970 8 2 MORIS MADISON [facility] 45 1    Reason for call:           Requested Prescriptions     Pending Prescriptions Disp Refills   • gabapentin (NEURONTIN) 300 MG capsule  0     Sig: Take 1 capsule by mouth 3 (Three) Times a Day.       *Copied from the [facility] note**    Caller: KAMLESH SMITH     Relationship: PT     Best call back number: [phone]     Medication needed:      GABAPENTIN 300 MG 3X PER DAY     When do you need the refill by: TODAY     What details did the patient provide when requesting the medication: PT IS OUT OF THE MEDICATION AND STATES SHE IS HAVING A BAD PAIN FLARE UP     Does the patient have less than a 3 day supply:  [x]? Yes  []? No     What is the patient's preferred pharmacy:       [facility] [phone]   [phone]

## 2021-02-25 ENCOUNTER — PATIENT OUTREACH (OUTPATIENT)
Dept: CASE MANAGEMENT | Facility: OTHER | Age: 51
End: 2021-02-25

## 2021-02-25 NOTE — OUTREACH NOTE
Patient Outreach Note    Pt notified that uncertain if home health would be covered, but recommended she contact Dr. Garrett via [employer] and ask if home health PT would be appropriate and if it is, would she place order.  Instructed to contact RN-ACM for any assistance needed.  She does think she will be able to get her neighbor to go to pharmacy when she gets off work to  her prescription.      Ninfa Lucero RN  Ambulatory     2/25/2021, 12:52 EST

## 2021-02-25 NOTE — OUTREACH NOTE
"Patient Outreach Note    Pt contacted regarding ED visit 2/16/21 with chief c/o listed as leg pain.  States \"I am not doing very well.\"  States having so much pain that she can hardly get out of bed.  Had telehealth visit with PCP, Dr. Garrett yest and was prescribed \"steroids that helped before, but I cannot get to pharmacy to get them.  Asked about family, which she states son is working and is unable to help at present.  Thought about going to parents home, that live in [place], however no one can take her.  She does have her car back.  Asked about her other issues, which she states she has filled out papers for rent and utilities.  She asked about home health to help her.  Will contact home health to check if it would potentially be covered if PCP thinks it is appropriate and orders.      Ninfa Lucero RN  Ambulatory     2/25/2021, 12:44 EST      "

## 2021-02-25 NOTE — OUTREACH NOTE
Care Coordination Note    [facility] contacted.  Not certain it would be covered, however they are full and cannot take on any patients at present.    Ninfa Lucero RN  Ambulatory     2/25/2021, 12:51 EST

## 2021-02-25 NOTE — OUTREACH NOTE
Patient Outreach Note    Pt received call from neurosurgeon's office and a telehealth visit is scheduled for tomorrow.      Ninfa Lucero RN  Ambulatory     2/25/2021, 13:02 EST

## 2021-02-26 ENCOUNTER — OFFICE VISIT (OUTPATIENT)
Dept: NEUROSURGERY | Facility: CLINIC | Age: 51
End: 2021-02-26

## 2021-02-26 ENCOUNTER — HOSPITAL ENCOUNTER (OUTPATIENT)
Dept: CT IMAGING | Facility: HOSPITAL | Age: 51
Discharge: HOME | End: 2021-02-26

## 2021-02-26 ENCOUNTER — HOSPITAL ENCOUNTER (OUTPATIENT)
Dept: MRI IMAGING | Facility: HOSPITAL | Age: 51
Discharge: HOME | End: 2021-02-26

## 2021-02-26 ENCOUNTER — PREP FOR SURGERY (OUTPATIENT)
Dept: OTHER | Facility: HOSPITAL | Age: 51
End: 2021-02-26

## 2021-02-26 VITALS — WEIGHT: 185 LBS | BODY MASS INDEX: 29.73 KG/M2 | HEIGHT: 66 IN | TEMPERATURE: 98.7 F

## 2021-02-26 DIAGNOSIS — M43.16 SPONDYLOLISTHESIS OF LUMBAR REGION: ICD-10-CM

## 2021-02-26 DIAGNOSIS — M54.50 LOW BACK PAIN, UNSPECIFIED BACK PAIN LATERALITY, UNSPECIFIED CHRONICITY, UNSPECIFIED WHETHER SCIATICA PRESENT: ICD-10-CM

## 2021-02-26 DIAGNOSIS — R29.898 WEAKNESS OF BOTH LOWER EXTREMITIES: ICD-10-CM

## 2021-02-26 DIAGNOSIS — M43.16 SPONDYLOLISTHESIS OF LUMBAR REGION: Primary | ICD-10-CM

## 2021-02-26 PROCEDURE — 72148 MRI LUMBAR SPINE W/O DYE: CPT

## 2021-02-26 PROCEDURE — 99214 OFFICE O/P EST MOD 30 MIN: CPT | Performed by: PHYSICIAN ASSISTANT

## 2021-02-26 PROCEDURE — 72131 CT LUMBAR SPINE W/O DYE: CPT

## 2021-02-26 RX ORDER — OXYCODONE HCL 10 MG/1
10 TABLET, FILM COATED, EXTENDED RELEASE ORAL ONCE
Status: CANCELLED | OUTPATIENT
Start: 2021-02-26 | End: 2021-02-26

## 2021-02-26 RX ORDER — IBUPROFEN 800 MG/1
800 TABLET ORAL ONCE
Status: CANCELLED | OUTPATIENT
Start: 2021-02-26 | End: 2021-02-26

## 2021-02-26 RX ORDER — CEFAZOLIN SODIUM 2 G/100ML
2 INJECTION, SOLUTION INTRAVENOUS ONCE
Status: CANCELLED | OUTPATIENT
Start: 2021-02-26 | End: 2021-02-26

## 2021-02-26 RX ORDER — PREGABALIN 150 MG/1
150 CAPSULE ORAL ONCE
Status: CANCELLED | OUTPATIENT
Start: 2021-02-26 | End: 2021-02-26

## 2021-02-26 RX ORDER — SODIUM CHLORIDE 0.9 % (FLUSH) 0.9 %
10 SYRINGE (ML) INJECTION AS NEEDED
Status: CANCELLED | OUTPATIENT
Start: 2021-02-26

## 2021-02-26 RX ORDER — IBUPROFEN 800 MG/1
800 TABLET ORAL
Qty: 90 TABLET | Refills: 1 | Status: SHIPPED | OUTPATIENT
Start: 2021-02-26 | End: 2021-03-17 | Stop reason: SINTOL

## 2021-02-26 RX ORDER — CYCLOBENZAPRINE HCL 10 MG
10 TABLET ORAL 3 TIMES DAILY PRN
Qty: 90 TABLET | Refills: 1 | Status: SHIPPED | OUTPATIENT
Start: 2021-02-26 | End: 2021-03-01 | Stop reason: HOSPADM

## 2021-02-26 RX ORDER — SODIUM CHLORIDE 0.9 % (FLUSH) 0.9 %
3 SYRINGE (ML) INJECTION EVERY 12 HOURS SCHEDULED
Status: CANCELLED | OUTPATIENT
Start: 2021-02-26

## 2021-02-26 RX ORDER — GABAPENTIN 300 MG/1
300 CAPSULE ORAL 3 TIMES DAILY
Qty: 90 CAPSULE | Refills: 0 | Status: SHIPPED | OUTPATIENT
Start: 2021-02-26

## 2021-02-26 RX ORDER — CHLORHEXIDINE GLUCONATE 4 G/100ML
SOLUTION TOPICAL
Qty: 120 ML | Refills: 0 | Status: SHIPPED | OUTPATIENT
Start: 2021-02-26 | End: 2021-03-08 | Stop reason: HOSPADM

## 2021-02-26 RX ORDER — ACETAMINOPHEN 500 MG
1000 TABLET ORAL ONCE
Status: CANCELLED | OUTPATIENT
Start: 2021-02-26 | End: 2021-02-26
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
## 2021-02-27 ENCOUNTER — HOSPITAL ENCOUNTER (INPATIENT)
Facility: HOSPITAL | Age: 51
LOS: 2 days | Discharge: HOME | End: 2021-03-01
Attending: EMERGENCY MEDICINE | Admitting: INTERNAL MEDICINE

## 2021-02-27 DIAGNOSIS — G89.29 CHRONIC RIGHT-SIDED LOW BACK PAIN WITH RIGHT-SIDED SCIATICA: Primary | ICD-10-CM

## 2021-02-27 DIAGNOSIS — M54.9 INTRACTABLE BACK PAIN: ICD-10-CM

## 2021-02-27 DIAGNOSIS — M54.41 CHRONIC RIGHT-SIDED LOW BACK PAIN WITH RIGHT-SIDED SCIATICA: Primary | ICD-10-CM

## 2021-02-27 DIAGNOSIS — M54.31 SCIATICA OF RIGHT SIDE: ICD-10-CM

## 2021-02-27 DIAGNOSIS — M43.16 SPONDYLOLISTHESIS OF LUMBAR REGION: Primary | ICD-10-CM

## 2021-02-27 PROBLEM — M54.30 SCIATICA: Status: ACTIVE | Noted: 2021-02-27

## 2021-02-27 LAB
ALBUMIN SERPL-MCNC: 4.1 G/DL (ref 3.5–5.2)
ALBUMIN/GLOB SERPL: 1.6 G/DL
ALP SERPL-CCNC: 72 U/L (ref 39–117)
ALT SERPL W P-5'-P-CCNC: 32 U/L (ref 1–33)
ANION GAP SERPL CALCULATED.3IONS-SCNC: 11 MMOL/L (ref 5–15)
AST SERPL-CCNC: 24 U/L (ref 1–32)
BASOPHILS # BLD AUTO: 0.08 10*3/MM3 (ref 0–0.2)
BASOPHILS NFR BLD AUTO: 0.7 % (ref 0–1.5)
BILIRUB SERPL-MCNC: <0.2 MG/DL (ref 0–1.2)
BUN SERPL-MCNC: 20 MG/DL (ref 6–20)
BUN/CREAT SERPL: 33.3 (ref 7–25)
CALCIUM SPEC-SCNC: 9.2 MG/DL (ref 8.6–10.5)
CHLORIDE SERPL-SCNC: 106 MMOL/L (ref 98–107)
CO2 SERPL-SCNC: 26 MMOL/L (ref 22–29)
CREAT SERPL-MCNC: 0.6 MG/DL (ref 0.57–1)
DEPRECATED RDW RBC AUTO: 50.7 FL (ref 37–54)
EOSINOPHIL # BLD AUTO: 0.19 10*3/MM3 (ref 0–0.4)
EOSINOPHIL NFR BLD AUTO: 1.6 % (ref 0.3–6.2)
ERYTHROCYTE [DISTWIDTH] IN BLOOD BY AUTOMATED COUNT: 15.4 % (ref 12.3–15.4)
GFR SERPL CREATININE-BSD FRML MDRD: 105 ML/MIN/1.73
GLOBULIN UR ELPH-MCNC: 2.5 GM/DL
GLUCOSE SERPL-MCNC: 100 MG/DL (ref 65–99)
HCT VFR BLD AUTO: 37.5 % (ref 34–46.6)
HGB BLD-MCNC: 12 G/DL (ref 12–15.9)
IMM GRANULOCYTES # BLD AUTO: 0.08 10*3/MM3 (ref 0–0.05)
IMM GRANULOCYTES NFR BLD AUTO: 0.7 % (ref 0–0.5)
INR PPP: 0.88 (ref 0.85–1.16)
LYMPHOCYTES # BLD AUTO: 5.56 10*3/MM3 (ref 0.7–3.1)
LYMPHOCYTES NFR BLD AUTO: 45.9 % (ref 19.6–45.3)
MCH RBC QN AUTO: 28.8 PG (ref 26.6–33)
MCHC RBC AUTO-ENTMCNC: 32 G/DL (ref 31.5–35.7)
MCV RBC AUTO: 89.9 FL (ref 79–97)
MONOCYTES # BLD AUTO: 0.71 10*3/MM3 (ref 0.1–0.9)
MONOCYTES NFR BLD AUTO: 5.9 % (ref 5–12)
NEUTROPHILS NFR BLD AUTO: 45.2 % (ref 42.7–76)
NEUTROPHILS NFR BLD AUTO: 5.5 10*3/MM3 (ref 1.7–7)
NRBC BLD AUTO-RTO: 0 /100 WBC (ref 0–0.2)
PLAT MORPH BLD: NORMAL
PLATELET # BLD AUTO: 411 10*3/MM3 (ref 140–450)
PMV BLD AUTO: 8.9 FL (ref 6–12)
POTASSIUM SERPL-SCNC: 4 MMOL/L (ref 3.5–5.2)
PROT SERPL-MCNC: 6.6 G/DL (ref 6–8.5)
PROTHROMBIN TIME: 11.6 SECONDS (ref 11.5–14)
RBC # BLD AUTO: 4.17 10*6/MM3 (ref 3.77–5.28)
RBC MORPH BLD: NORMAL
SODIUM SERPL-SCNC: 143 MMOL/L (ref 136–145)
WBC # BLD AUTO: 12.12 10*3/MM3 (ref 3.4–10.8)
WBC MORPH BLD: NORMAL

## 2021-02-27 PROCEDURE — 25010000002 HYDROMORPHONE PER 4 MG: Performed by: EMERGENCY MEDICINE

## 2021-02-27 PROCEDURE — 25010000002 DEXAMETHASONE PER 1 MG: Performed by: EMERGENCY MEDICINE

## 2021-02-27 PROCEDURE — 85025 COMPLETE CBC W/AUTO DIFF WBC: CPT | Performed by: EMERGENCY MEDICINE

## 2021-02-27 PROCEDURE — 80053 COMPREHEN METABOLIC PANEL: CPT | Performed by: EMERGENCY MEDICINE

## 2021-02-27 PROCEDURE — 87636 SARSCOV2 & INF A&B AMP PRB: CPT | Performed by: EMERGENCY MEDICINE

## 2021-02-27 PROCEDURE — 85610 PROTHROMBIN TIME: CPT | Performed by: EMERGENCY MEDICINE

## 2021-02-27 PROCEDURE — 85007 BL SMEAR W/DIFF WBC COUNT: CPT | Performed by: EMERGENCY MEDICINE

## 2021-02-27 PROCEDURE — 99284 EMERGENCY DEPT VISIT MOD MDM: CPT

## 2021-02-27 PROCEDURE — 25010000002 KETOROLAC TROMETHAMINE PER 15 MG: Performed by: EMERGENCY MEDICINE

## 2021-02-27 RX ORDER — TRAMADOL HYDROCHLORIDE 50 MG/1
50 TABLET ORAL 4 TIMES DAILY
Qty: 40 TABLET | Refills: 1 | Status: SHIPPED | OUTPATIENT
Start: 2021-02-27 | End: 2021-03-01 | Stop reason: HOSPADM

## 2021-02-27 RX ORDER — DEXAMETHASONE SODIUM PHOSPHATE 4 MG/ML
4 INJECTION, SOLUTION INTRA-ARTICULAR; INTRALESIONAL; INTRAMUSCULAR; INTRAVENOUS; SOFT TISSUE EVERY 6 HOURS
Status: DISCONTINUED | OUTPATIENT
Start: 2021-02-27 | End: 2021-02-28 | Stop reason: ALTCHOICE

## 2021-02-27 RX ORDER — HYDROMORPHONE HYDROCHLORIDE 1 MG/ML
0.5 INJECTION, SOLUTION INTRAMUSCULAR; INTRAVENOUS; SUBCUTANEOUS ONCE
Status: COMPLETED | OUTPATIENT
Start: 2021-02-27 | End: 2021-02-27

## 2021-02-27 RX ORDER — KETOROLAC TROMETHAMINE 15 MG/ML
15 INJECTION, SOLUTION INTRAMUSCULAR; INTRAVENOUS ONCE
Status: COMPLETED | OUTPATIENT
Start: 2021-02-27 | End: 2021-02-27

## 2021-02-27 RX ORDER — SODIUM CHLORIDE 0.9 % (FLUSH) 0.9 %
10 SYRINGE (ML) INJECTION AS NEEDED
Status: DISCONTINUED | OUTPATIENT
Start: 2021-02-27 | End: 2021-03-01 | Stop reason: HOSPADM

## 2021-02-27 RX ADMIN — HYDROMORPHONE HYDROCHLORIDE 0.5 MG: 1 INJECTION, SOLUTION INTRAMUSCULAR; INTRAVENOUS; SUBCUTANEOUS at 22:07

## 2021-02-27 RX ADMIN — DEXAMETHASONE SODIUM PHOSPHATE 4 MG: 4 INJECTION, SOLUTION INTRA-ARTICULAR; INTRALESIONAL; INTRAMUSCULAR; INTRAVENOUS; SOFT TISSUE at 22:08

## 2021-02-27 RX ADMIN — KETOROLAC TROMETHAMINE 15 MG: 15 INJECTION, SOLUTION INTRAMUSCULAR; INTRAVENOUS at 22:07

## 2021-02-27 RX ADMIN — HYDROMORPHONE HYDROCHLORIDE 0.5 MG: 1 INJECTION, SOLUTION INTRAMUSCULAR; INTRAVENOUS; SUBCUTANEOUS at 23:53

## 2021-02-27 NOTE — PROGRESS NOTES
Pain is leg is worse. I have told her increase her neurontin to qid, continue flexeril, I have E-Rx'd Tramadol 50mg, QID. She will call on Monday to schedule surgery.     JAMIL EsquivelC

## 2021-02-28 ENCOUNTER — APPOINTMENT (OUTPATIENT)
Dept: GENERAL RADIOLOGY | Facility: HOSPITAL | Age: 51
End: 2021-02-28

## 2021-02-28 PROBLEM — D72.829 LEUKOCYTOSIS: Status: ACTIVE | Noted: 2021-02-28

## 2021-02-28 PROBLEM — Z72.0 TOBACCO ABUSE: Status: ACTIVE | Noted: 2021-02-28

## 2021-02-28 LAB
ANION GAP SERPL CALCULATED.3IONS-SCNC: 8 MMOL/L (ref 5–15)
BACTERIA UR QL AUTO: ABNORMAL /HPF
BILIRUB UR QL STRIP: NEGATIVE
BUN SERPL-MCNC: 20 MG/DL (ref 6–20)
BUN/CREAT SERPL: 37.7 (ref 7–25)
CALCIUM SPEC-SCNC: 9 MG/DL (ref 8.6–10.5)
CHLORIDE SERPL-SCNC: 104 MMOL/L (ref 98–107)
CLARITY UR: CLEAR
CO2 SERPL-SCNC: 26 MMOL/L (ref 22–29)
COLOR UR: YELLOW
CREAT SERPL-MCNC: 0.53 MG/DL (ref 0.57–1)
DEPRECATED RDW RBC AUTO: 51 FL (ref 37–54)
ERYTHROCYTE [DISTWIDTH] IN BLOOD BY AUTOMATED COUNT: 15.5 % (ref 12.3–15.4)
FLUAV RNA RESP QL NAA+PROBE: NOT DETECTED
FLUBV RNA RESP QL NAA+PROBE: NOT DETECTED
GFR SERPL CREATININE-BSD FRML MDRD: 122 ML/MIN/1.73
GLUCOSE SERPL-MCNC: 123 MG/DL (ref 65–99)
GLUCOSE UR STRIP-MCNC: NEGATIVE MG/DL
HBA1C MFR BLD: 5.9 % (ref 4.8–5.6)
HCT VFR BLD AUTO: 38.8 % (ref 34–46.6)
HGB BLD-MCNC: 12.1 G/DL (ref 12–15.9)
HGB UR QL STRIP.AUTO: ABNORMAL
HYALINE CASTS UR QL AUTO: ABNORMAL /LPF
KETONES UR QL STRIP: NEGATIVE
LEUKOCYTE ESTERASE UR QL STRIP.AUTO: ABNORMAL
MAGNESIUM SERPL-MCNC: 1.7 MG/DL (ref 1.6–2.6)
MCH RBC QN AUTO: 27.9 PG (ref 26.6–33)
MCHC RBC AUTO-ENTMCNC: 31.2 G/DL (ref 31.5–35.7)
MCV RBC AUTO: 89.6 FL (ref 79–97)
NITRITE UR QL STRIP: NEGATIVE
PH UR STRIP.AUTO: 5.5 [PH] (ref 5–8)
PLATELET # BLD AUTO: 393 10*3/MM3 (ref 140–450)
PMV BLD AUTO: 8.8 FL (ref 6–12)
POTASSIUM SERPL-SCNC: 4.3 MMOL/L (ref 3.5–5.2)
PROT UR QL STRIP: NEGATIVE
RBC # BLD AUTO: 4.33 10*6/MM3 (ref 3.77–5.28)
RBC # UR: ABNORMAL /HPF
REF LAB TEST METHOD: ABNORMAL
SARS-COV-2 RNA RESP QL NAA+PROBE: NOT DETECTED
SODIUM SERPL-SCNC: 138 MMOL/L (ref 136–145)
SP GR UR STRIP: 1.02 (ref 1–1.03)
SQUAMOUS #/AREA URNS HPF: ABNORMAL /HPF
UROBILINOGEN UR QL STRIP: ABNORMAL
WBC # BLD AUTO: 10.29 10*3/MM3 (ref 3.4–10.8)
WBC UR QL AUTO: ABNORMAL /HPF

## 2021-02-28 PROCEDURE — 25010000002 HYDROMORPHONE PER 4 MG: Performed by: INTERNAL MEDICINE

## 2021-02-28 PROCEDURE — 81001 URINALYSIS AUTO W/SCOPE: CPT | Performed by: NURSE PRACTITIONER

## 2021-02-28 PROCEDURE — 83036 HEMOGLOBIN GLYCOSYLATED A1C: CPT | Performed by: NURSE PRACTITIONER

## 2021-02-28 PROCEDURE — 80048 BASIC METABOLIC PNL TOTAL CA: CPT | Performed by: NURSE PRACTITIONER

## 2021-02-28 PROCEDURE — 25010000002 DEXAMETHASONE PER 1 MG: Performed by: NURSE PRACTITIONER

## 2021-02-28 PROCEDURE — 87086 URINE CULTURE/COLONY COUNT: CPT | Performed by: NURSE PRACTITIONER

## 2021-02-28 PROCEDURE — 85027 COMPLETE CBC AUTOMATED: CPT | Performed by: NURSE PRACTITIONER

## 2021-02-28 PROCEDURE — 83735 ASSAY OF MAGNESIUM: CPT | Performed by: NURSE PRACTITIONER

## 2021-02-28 PROCEDURE — 99221 1ST HOSP IP/OBS SF/LOW 40: CPT | Performed by: PHYSICIAN ASSISTANT

## 2021-02-28 PROCEDURE — 25010000002 CEFTRIAXONE PER 250 MG: Performed by: INTERNAL MEDICINE

## 2021-02-28 PROCEDURE — 25010000002 KETOROLAC TROMETHAMINE PER 15 MG: Performed by: NURSE PRACTITIONER

## 2021-02-28 PROCEDURE — 99223 1ST HOSP IP/OBS HIGH 75: CPT | Performed by: INTERNAL MEDICINE

## 2021-02-28 PROCEDURE — 71045 X-RAY EXAM CHEST 1 VIEW: CPT

## 2021-02-28 PROCEDURE — P9612 CATHETERIZE FOR URINE SPEC: HCPCS

## 2021-02-28 RX ORDER — OXYCODONE HYDROCHLORIDE AND ACETAMINOPHEN 5; 325 MG/1; MG/1
2 TABLET ORAL EVERY 4 HOURS PRN
Status: DISCONTINUED | OUTPATIENT
Start: 2021-02-28 | End: 2021-03-01

## 2021-02-28 RX ORDER — CYCLOBENZAPRINE HCL 10 MG
10 TABLET ORAL 3 TIMES DAILY PRN
Status: DISCONTINUED | OUTPATIENT
Start: 2021-02-28 | End: 2021-02-28

## 2021-02-28 RX ORDER — DEXAMETHASONE SODIUM PHOSPHATE 4 MG/ML
4 INJECTION, SOLUTION INTRA-ARTICULAR; INTRALESIONAL; INTRAMUSCULAR; INTRAVENOUS; SOFT TISSUE EVERY 6 HOURS
Status: DISCONTINUED | OUTPATIENT
Start: 2021-02-28 | End: 2021-03-01 | Stop reason: HOSPADM

## 2021-02-28 RX ORDER — DEXAMETHASONE SODIUM PHOSPHATE 4 MG/ML
4 INJECTION, SOLUTION INTRA-ARTICULAR; INTRALESIONAL; INTRAMUSCULAR; INTRAVENOUS; SOFT TISSUE 2 TIMES DAILY
Status: DISCONTINUED | OUTPATIENT
Start: 2021-02-28 | End: 2021-02-28

## 2021-02-28 RX ORDER — NICOTINE 21 MG/24HR
1 PATCH, TRANSDERMAL 24 HOURS TRANSDERMAL
Status: DISCONTINUED | OUTPATIENT
Start: 2021-02-28 | End: 2021-03-01 | Stop reason: HOSPADM

## 2021-02-28 RX ORDER — ACETAMINOPHEN 650 MG/1
650 SUPPOSITORY RECTAL EVERY 4 HOURS PRN
Status: DISCONTINUED | OUTPATIENT
Start: 2021-02-28 | End: 2021-03-01 | Stop reason: HOSPADM

## 2021-02-28 RX ORDER — ACETAMINOPHEN 160 MG/5ML
650 SOLUTION ORAL EVERY 4 HOURS PRN
Status: DISCONTINUED | OUTPATIENT
Start: 2021-02-28 | End: 2021-03-01 | Stop reason: HOSPADM

## 2021-02-28 RX ORDER — VENLAFAXINE HYDROCHLORIDE 75 MG/1
150 CAPSULE, EXTENDED RELEASE ORAL DAILY
Status: DISCONTINUED | OUTPATIENT
Start: 2021-02-28 | End: 2021-03-01 | Stop reason: HOSPADM

## 2021-02-28 RX ORDER — ACETAMINOPHEN 325 MG/1
650 TABLET ORAL EVERY 4 HOURS PRN
Status: DISCONTINUED | OUTPATIENT
Start: 2021-02-28 | End: 2021-03-01 | Stop reason: HOSPADM

## 2021-02-28 RX ORDER — HYDROMORPHONE HYDROCHLORIDE 1 MG/ML
0.5 INJECTION, SOLUTION INTRAMUSCULAR; INTRAVENOUS; SUBCUTANEOUS
Status: DISCONTINUED | OUTPATIENT
Start: 2021-02-28 | End: 2021-02-28

## 2021-02-28 RX ORDER — ROPINIROLE 2 MG/1
2 TABLET, FILM COATED ORAL 2 TIMES DAILY
Status: DISCONTINUED | OUTPATIENT
Start: 2021-02-28 | End: 2021-03-01 | Stop reason: HOSPADM

## 2021-02-28 RX ORDER — KETOROLAC TROMETHAMINE 15 MG/ML
15 INJECTION, SOLUTION INTRAMUSCULAR; INTRAVENOUS ONCE
Status: COMPLETED | OUTPATIENT
Start: 2021-02-28 | End: 2021-02-28

## 2021-02-28 RX ORDER — HYDROMORPHONE HYDROCHLORIDE 1 MG/ML
0.5 INJECTION, SOLUTION INTRAMUSCULAR; INTRAVENOUS; SUBCUTANEOUS EVERY 4 HOURS PRN
Status: DISCONTINUED | OUTPATIENT
Start: 2021-02-28 | End: 2021-02-28 | Stop reason: SDUPTHER

## 2021-02-28 RX ORDER — SODIUM CHLORIDE 0.9 % (FLUSH) 0.9 %
10 SYRINGE (ML) INJECTION EVERY 12 HOURS SCHEDULED
Status: DISCONTINUED | OUTPATIENT
Start: 2021-02-28 | End: 2021-03-01 | Stop reason: HOSPADM

## 2021-02-28 RX ORDER — GABAPENTIN 300 MG/1
300 CAPSULE ORAL 3 TIMES DAILY
Status: DISCONTINUED | OUTPATIENT
Start: 2021-02-28 | End: 2021-03-01

## 2021-02-28 RX ORDER — TRAMADOL HYDROCHLORIDE 50 MG/1
50 TABLET ORAL EVERY 8 HOURS PRN
Status: DISCONTINUED | OUTPATIENT
Start: 2021-02-28 | End: 2021-03-01

## 2021-02-28 RX ORDER — IPRATROPIUM BROMIDE AND ALBUTEROL SULFATE 2.5; .5 MG/3ML; MG/3ML
3 SOLUTION RESPIRATORY (INHALATION) EVERY 4 HOURS PRN
Status: DISCONTINUED | OUTPATIENT
Start: 2021-02-28 | End: 2021-03-01 | Stop reason: HOSPADM

## 2021-02-28 RX ORDER — HYDROMORPHONE HYDROCHLORIDE 1 MG/ML
0.25 INJECTION, SOLUTION INTRAMUSCULAR; INTRAVENOUS; SUBCUTANEOUS EVERY 4 HOURS PRN
Status: DISCONTINUED | OUTPATIENT
Start: 2021-02-28 | End: 2021-03-01 | Stop reason: HOSPADM

## 2021-02-28 RX ORDER — OXYCODONE HYDROCHLORIDE AND ACETAMINOPHEN 5; 325 MG/1; MG/1
1 TABLET ORAL EVERY 4 HOURS PRN
Status: DISCONTINUED | OUTPATIENT
Start: 2021-02-28 | End: 2021-02-28

## 2021-02-28 RX ORDER — SODIUM CHLORIDE 0.9 % (FLUSH) 0.9 %
10 SYRINGE (ML) INJECTION AS NEEDED
Status: DISCONTINUED | OUTPATIENT
Start: 2021-02-28 | End: 2021-03-01 | Stop reason: HOSPADM

## 2021-02-28 RX ORDER — ONDANSETRON 2 MG/ML
4 INJECTION INTRAMUSCULAR; INTRAVENOUS EVERY 6 HOURS PRN
Status: DISCONTINUED | OUTPATIENT
Start: 2021-02-28 | End: 2021-03-01 | Stop reason: HOSPADM

## 2021-02-28 RX ORDER — ONDANSETRON 4 MG/1
4 TABLET, FILM COATED ORAL EVERY 6 HOURS PRN
Status: DISCONTINUED | OUTPATIENT
Start: 2021-02-28 | End: 2021-03-01 | Stop reason: HOSPADM

## 2021-02-28 RX ADMIN — GABAPENTIN 300 MG: 300 CAPSULE ORAL at 21:44

## 2021-02-28 RX ADMIN — GABAPENTIN 300 MG: 300 CAPSULE ORAL at 08:11

## 2021-02-28 RX ADMIN — KETOROLAC TROMETHAMINE 15 MG: 15 INJECTION, SOLUTION INTRAMUSCULAR; INTRAVENOUS at 05:12

## 2021-02-28 RX ADMIN — SODIUM CHLORIDE, PRESERVATIVE FREE 10 ML: 5 INJECTION INTRAVENOUS at 08:12

## 2021-02-28 RX ADMIN — OXYCODONE HYDROCHLORIDE AND ACETAMINOPHEN 1 TABLET: 5; 325 TABLET ORAL at 09:07

## 2021-02-28 RX ADMIN — SODIUM CHLORIDE, PRESERVATIVE FREE 10 ML: 5 INJECTION INTRAVENOUS at 02:44

## 2021-02-28 RX ADMIN — HYDROMORPHONE HYDROCHLORIDE 0.5 MG: 1 INJECTION, SOLUTION INTRAMUSCULAR; INTRAVENOUS; SUBCUTANEOUS at 05:34

## 2021-02-28 RX ADMIN — VENLAFAXINE HYDROCHLORIDE 150 MG: 75 CAPSULE, EXTENDED RELEASE ORAL at 08:11

## 2021-02-28 RX ADMIN — OXYCODONE HYDROCHLORIDE AND ACETAMINOPHEN 2 TABLET: 5; 325 TABLET ORAL at 15:03

## 2021-02-28 RX ADMIN — HYDROMORPHONE HYDROCHLORIDE 0.25 MG: 1 INJECTION, SOLUTION INTRAMUSCULAR; INTRAVENOUS; SUBCUTANEOUS at 11:28

## 2021-02-28 RX ADMIN — CYCLOBENZAPRINE HYDROCHLORIDE 10 MG: 10 TABLET, FILM COATED ORAL at 01:51

## 2021-02-28 RX ADMIN — SODIUM CHLORIDE 1 G: 900 INJECTION INTRAVENOUS at 11:27

## 2021-02-28 RX ADMIN — ROPINIROLE HYDROCHLORIDE 2 MG: 2 TABLET, FILM COATED ORAL at 21:44

## 2021-02-28 RX ADMIN — OXYCODONE HYDROCHLORIDE AND ACETAMINOPHEN 2 TABLET: 5; 325 TABLET ORAL at 21:43

## 2021-02-28 RX ADMIN — HYDROMORPHONE HYDROCHLORIDE 0.5 MG: 1 INJECTION, SOLUTION INTRAMUSCULAR; INTRAVENOUS; SUBCUTANEOUS at 08:11

## 2021-02-28 RX ADMIN — DEXAMETHASONE SODIUM PHOSPHATE 4 MG: 4 INJECTION, SOLUTION INTRA-ARTICULAR; INTRALESIONAL; INTRAMUSCULAR; INTRAVENOUS; SOFT TISSUE at 21:43

## 2021-02-28 RX ADMIN — TRAMADOL HYDROCHLORIDE 50 MG: 50 TABLET, FILM COATED ORAL at 02:46

## 2021-02-28 RX ADMIN — DEXAMETHASONE SODIUM PHOSPHATE 4 MG: 4 INJECTION, SOLUTION INTRA-ARTICULAR; INTRALESIONAL; INTRAMUSCULAR; INTRAVENOUS; SOFT TISSUE at 09:07

## 2021-02-28 RX ADMIN — TRAMADOL HYDROCHLORIDE 50 MG: 50 TABLET, FILM COATED ORAL at 22:41

## 2021-02-28 RX ADMIN — DEXAMETHASONE SODIUM PHOSPHATE 4 MG: 4 INJECTION, SOLUTION INTRA-ARTICULAR; INTRALESIONAL; INTRAMUSCULAR; INTRAVENOUS; SOFT TISSUE at 03:46

## 2021-02-28 RX ADMIN — GABAPENTIN 300 MG: 300 CAPSULE ORAL at 15:03

## 2021-02-28 RX ADMIN — HYDROMORPHONE HYDROCHLORIDE 0.25 MG: 1 INJECTION, SOLUTION INTRAMUSCULAR; INTRAVENOUS; SUBCUTANEOUS at 19:42

## 2021-02-28 RX ADMIN — HYDROMORPHONE HYDROCHLORIDE 0.5 MG: 1 INJECTION, SOLUTION INTRAMUSCULAR; INTRAVENOUS; SUBCUTANEOUS at 01:52

## 2021-02-28 RX ADMIN — DEXAMETHASONE SODIUM PHOSPHATE 4 MG: 4 INJECTION, SOLUTION INTRA-ARTICULAR; INTRALESIONAL; INTRAMUSCULAR; INTRAVENOUS; SOFT TISSUE at 15:03

## 2021-02-28 RX ADMIN — HYDROMORPHONE HYDROCHLORIDE 0.5 MG: 1 INJECTION, SOLUTION INTRAMUSCULAR; INTRAVENOUS; SUBCUTANEOUS at 03:46

## 2021-03-01 ENCOUNTER — TELEPHONE (OUTPATIENT)
Dept: INTERNAL MEDICINE | Facility: CLINIC | Age: 51
End: 2021-03-01

## 2021-03-01 ENCOUNTER — READMISSION MANAGEMENT (OUTPATIENT)
Dept: CALL CENTER | Facility: HOSPITAL | Age: 51
End: 2021-03-01

## 2021-03-01 VITALS
SYSTOLIC BLOOD PRESSURE: 149 MMHG | HEART RATE: 83 BPM | TEMPERATURE: 97.9 F | DIASTOLIC BLOOD PRESSURE: 89 MMHG | WEIGHT: 206.9 LBS | BODY MASS INDEX: 33.25 KG/M2 | RESPIRATION RATE: 16 BRPM | HEIGHT: 66 IN | OXYGEN SATURATION: 94 %

## 2021-03-01 LAB
BACTERIA SPEC AEROBE CULT: ABNORMAL
BACTERIA SPEC AEROBE CULT: ABNORMAL

## 2021-03-01 PROCEDURE — 99231 SBSQ HOSP IP/OBS SF/LOW 25: CPT | Performed by: PHYSICIAN ASSISTANT

## 2021-03-01 PROCEDURE — 25010000002 HYDROMORPHONE PER 4 MG: Performed by: INTERNAL MEDICINE

## 2021-03-01 PROCEDURE — 25010000002 KETOROLAC TROMETHAMINE PER 15 MG: Performed by: NURSE PRACTITIONER

## 2021-03-01 PROCEDURE — 25010000002 CEFTRIAXONE PER 250 MG: Performed by: INTERNAL MEDICINE

## 2021-03-01 PROCEDURE — 25010000002 DEXAMETHASONE PER 1 MG: Performed by: NURSE PRACTITIONER

## 2021-03-01 PROCEDURE — 99239 HOSP IP/OBS DSCHRG MGMT >30: CPT | Performed by: INTERNAL MEDICINE

## 2021-03-01 PROCEDURE — 25010000002 KETOROLAC TROMETHAMINE PER 15 MG: Performed by: INTERNAL MEDICINE

## 2021-03-01 RX ORDER — GABAPENTIN 300 MG/1
600 CAPSULE ORAL 3 TIMES DAILY
Status: DISCONTINUED | OUTPATIENT
Start: 2021-03-01 | End: 2021-03-01 | Stop reason: HOSPADM

## 2021-03-01 RX ORDER — CARISOPRODOL 350 MG/1
350 TABLET ORAL EVERY 6 HOURS SCHEDULED
Status: DISCONTINUED | OUTPATIENT
Start: 2021-03-01 | End: 2021-03-01 | Stop reason: HOSPADM

## 2021-03-01 RX ORDER — DEXAMETHASONE 4 MG/1
4 TABLET ORAL 2 TIMES DAILY WITH MEALS
Qty: 14 TABLET | Refills: 0 | Status: SHIPPED | OUTPATIENT
Start: 2021-03-01 | End: 2021-03-17

## 2021-03-01 RX ORDER — CEFUROXIME AXETIL 500 MG/1
500 TABLET ORAL 2 TIMES DAILY
Qty: 10 TABLET | Refills: 0 | Status: SHIPPED | OUTPATIENT
Start: 2021-03-01 | End: 2021-03-06

## 2021-03-01 RX ORDER — CYCLOBENZAPRINE HCL 10 MG
5 TABLET ORAL ONCE
Status: COMPLETED | OUTPATIENT
Start: 2021-03-01 | End: 2021-03-01

## 2021-03-01 RX ORDER — OXYCODONE AND ACETAMINOPHEN 7.5; 325 MG/1; MG/1
1 TABLET ORAL EVERY 6 HOURS PRN
Status: DISCONTINUED | OUTPATIENT
Start: 2021-03-01 | End: 2021-03-01 | Stop reason: HOSPADM

## 2021-03-01 RX ORDER — OXYCODONE AND ACETAMINOPHEN 7.5; 325 MG/1; MG/1
1 TABLET ORAL EVERY 6 HOURS PRN
Qty: 20 TABLET | Refills: 0 | Status: SHIPPED | OUTPATIENT
Start: 2021-03-01 | End: 2021-03-06

## 2021-03-01 RX ORDER — KETOROLAC TROMETHAMINE 15 MG/ML
15 INJECTION, SOLUTION INTRAMUSCULAR; INTRAVENOUS ONCE
Status: COMPLETED | OUTPATIENT
Start: 2021-03-01 | End: 2021-03-01

## 2021-03-01 RX ORDER — KETOROLAC TROMETHAMINE 15 MG/ML
15 INJECTION, SOLUTION INTRAMUSCULAR; INTRAVENOUS EVERY 6 HOURS PRN
Status: DISCONTINUED | OUTPATIENT
Start: 2021-03-01 | End: 2021-03-01 | Stop reason: HOSPADM

## 2021-03-01 RX ORDER — CARISOPRODOL 350 MG/1
350 TABLET ORAL EVERY 6 HOURS SCHEDULED
Qty: 39 TABLET | Refills: 0 | Status: SHIPPED | OUTPATIENT
Start: 2021-03-01 | End: 2021-03-08 | Stop reason: HOSPADM

## 2021-03-01 RX ADMIN — ROPINIROLE HYDROCHLORIDE 2 MG: 2 TABLET, FILM COATED ORAL at 08:18

## 2021-03-01 RX ADMIN — OXYCODONE HYDROCHLORIDE AND ACETAMINOPHEN 2 TABLET: 5; 325 TABLET ORAL at 09:40

## 2021-03-01 RX ADMIN — HYDROMORPHONE HYDROCHLORIDE 0.25 MG: 1 INJECTION, SOLUTION INTRAMUSCULAR; INTRAVENOUS; SUBCUTANEOUS at 12:35

## 2021-03-01 RX ADMIN — DEXAMETHASONE SODIUM PHOSPHATE 4 MG: 4 INJECTION, SOLUTION INTRA-ARTICULAR; INTRALESIONAL; INTRAMUSCULAR; INTRAVENOUS; SOFT TISSUE at 03:55

## 2021-03-01 RX ADMIN — SODIUM CHLORIDE 1 G: 900 INJECTION INTRAVENOUS at 09:40

## 2021-03-01 RX ADMIN — OXYCODONE HYDROCHLORIDE AND ACETAMINOPHEN 2 TABLET: 5; 325 TABLET ORAL at 06:00

## 2021-03-01 RX ADMIN — HYDROMORPHONE HYDROCHLORIDE 0.25 MG: 1 INJECTION, SOLUTION INTRAMUSCULAR; INTRAVENOUS; SUBCUTANEOUS at 03:55

## 2021-03-01 RX ADMIN — GABAPENTIN 300 MG: 300 CAPSULE ORAL at 08:18

## 2021-03-01 RX ADMIN — OXYCODONE HYDROCHLORIDE AND ACETAMINOPHEN 2 TABLET: 5; 325 TABLET ORAL at 02:27

## 2021-03-01 RX ADMIN — HYDROMORPHONE HYDROCHLORIDE 0.25 MG: 1 INJECTION, SOLUTION INTRAMUSCULAR; INTRAVENOUS; SUBCUTANEOUS at 08:16

## 2021-03-01 RX ADMIN — KETOROLAC TROMETHAMINE 15 MG: 15 INJECTION, SOLUTION INTRAMUSCULAR; INTRAVENOUS at 08:35

## 2021-03-01 RX ADMIN — ACETAMINOPHEN 650 MG: 325 TABLET ORAL at 02:49

## 2021-03-01 RX ADMIN — CARISOPRODOL 350 MG: 350 TABLET ORAL at 11:11

## 2021-03-01 RX ADMIN — KETOROLAC TROMETHAMINE 15 MG: 15 INJECTION, SOLUTION INTRAMUSCULAR; INTRAVENOUS at 03:07

## 2021-03-01 RX ADMIN — SODIUM CHLORIDE, PRESERVATIVE FREE 10 ML: 5 INJECTION INTRAVENOUS at 08:21

## 2021-03-01 RX ADMIN — CYCLOBENZAPRINE HYDROCHLORIDE 5 MG: 10 TABLET, FILM COATED ORAL at 03:07

## 2021-03-01 RX ADMIN — DEXAMETHASONE SODIUM PHOSPHATE 4 MG: 4 INJECTION, SOLUTION INTRA-ARTICULAR; INTRALESIONAL; INTRAMUSCULAR; INTRAVENOUS; SOFT TISSUE at 11:11

## 2021-03-01 RX ADMIN — VENLAFAXINE HYDROCHLORIDE 150 MG: 75 CAPSULE, EXTENDED RELEASE ORAL at 08:18

## 2021-03-01 RX ADMIN — HYDROMORPHONE HYDROCHLORIDE 0.25 MG: 1 INJECTION, SOLUTION INTRAMUSCULAR; INTRAVENOUS; SUBCUTANEOUS at 00:32

## 2021-03-01 NOTE — PLAN OF CARE
"Pt voiding without difficulty up with 1 to BSC. Pain increasingly worse PRNs ineffective, Naye \"Meghana\" Rina APRN contacted r/t break through pain x1 flexiril 5mg and toradol x15mg. RLE numb, foot drop noted. Dr Dawson to take over, possible surgery sometime this week.       Goal Outcome Evaluation:           "

## 2021-03-01 NOTE — NURSING NOTE
"Entered room to remove PIVL and patient stated that \"I already took them out\" unable to locate the IVs to verify tip intact for removal.   "

## 2021-03-01 NOTE — OUTREACH NOTE
Prep Survey      Responses   Religion facility patient discharged from?  [place]   Is LACE score < 7 ?  No   Emergency Room discharge w/ pulse ox?  No   Eligibility  [facility]   Date of Admission  02/27/21   Date of Discharge  03/01/21   Discharge Disposition  Home or Self Care   Discharge diagnosis  Sciatica pain   Does the patient have one of the following disease processes/diagnoses(primary or secondary)?  Other   Does the patient have Home health ordered?  No   Is there a DME ordered?  Yes   What DME was ordered?  [employer] for rolling walker   Prep survey completed?  Yes          Lizbeth Heller RN

## 2021-03-01 NOTE — PROGRESS NOTES
"Discharge Planning Assessment  [facility]     Patient Name: Shahana Cristobal  MRN: [id number]  Today's Date: 3/1/2021    Admit Date: 2/27/2021    Discharge Needs Assessment     Row Name 03/01/21 0941       Living Environment    Lives With  alone    Current Living Arrangements  home/apartment/condo    Primary Care Provided by  self    Provides Primary Care For  no one    Family Caregiver if Needed  child(leonard), adult    Quality of Family Relationships  unable to assess    Able to Return to Prior Arrangements  yes       Resource/Environmental Concerns    Resource/Environmental Concerns  none       Transition Planning    Patient/Family Anticipates Transition to  home    Patient/Family Anticipated Services at Transition      Transportation Anticipated  family or friend will provide       Discharge Needs Assessment    Readmission Within the Last 30 Days  no previous admission in last 30 days    Equipment Currently Used at Home  none    Concerns to be Addressed  discharge planning    Anticipated Changes Related to Illness  none        Discharge Plan     Row Name 03/01/21 0942       Plan    Plan  Home    Patient/Family in Agreement with Plan  yes    Plan Comments  Spoke with patient in room to initiate discharge planning.  She lives alone in [place].  She is normally independent with ADL's.  She has no DME at home and is not current with home health.  Ms. Cristobal has RX coverage and has her scripts filled at [facility].  Patient is very tearful during conversation, stating that she is being \"sent home today because they can't get her into surgery until next Tuesday.\"  Patient states that she will have her daughter come to get her and she will stay with her until her surgery because she is not able to care for herself at this time.  She is requesting a rolling walker to assist.  CM will continue to follow and assist with any discharge needs.  Juan Wilson RN [phone]    Final Discharge Disposition Code "  01 - home or self-care        Continued Care and Services - Admitted Since 2/27/2021    Coordination has not been started for this encounter.       Expected Discharge Date and Time     Expected Discharge Date Expected Discharge Time    Mar 5, 2021         Demographic Summary     Row Name 03/01/21 0940       General Information    Admission Type  inpatient    Arrived From  emergency department    Referral Source  admission list    Reason for Consult  discharge planning    Preferred Language  English     Used During This Interaction  no    General Information Comments  PCP- Estela Garrett        Functional Status     Row Name 03/01/21 0940       Functional Status    Usual Activity Tolerance  moderate    Current Activity Tolerance  poor       Functional Status, IADL    Medications  independent    Meal Preparation  independent    Housekeeping  independent    Laundry  independent    Shopping  independent        Psychosocial    No documentation.       Abuse/Neglect    No documentation.       Legal     Row Name 03/01/21 0941       Financial/Legal    Finance Comments  Verified with patient that she has [place] Medicaid.  No issues obtaining medications.        Substance Abuse    No documentation.       Patient Forms    No documentation.           Meredith Wilson RN

## 2021-03-02 ENCOUNTER — ANESTHESIA EVENT (OUTPATIENT)
Dept: PERIOP | Facility: HOSPITAL | Age: 51
End: 2021-03-02

## 2021-03-02 ENCOUNTER — READMISSION MANAGEMENT (OUTPATIENT)
Dept: CALL CENTER | Facility: HOSPITAL | Age: 51
End: 2021-03-02

## 2021-03-02 ENCOUNTER — PREP FOR SURGERY (OUTPATIENT)
Dept: OTHER | Facility: HOSPITAL | Age: 51
End: 2021-03-02

## 2021-03-02 ENCOUNTER — TRANSITIONAL CARE MANAGEMENT TELEPHONE ENCOUNTER (OUTPATIENT)
Dept: CALL CENTER | Facility: HOSPITAL | Age: 51
End: 2021-03-02

## 2021-03-02 ENCOUNTER — APPOINTMENT (OUTPATIENT)
Dept: GENERAL RADIOLOGY | Facility: HOSPITAL | Age: 51
End: 2021-03-02

## 2021-03-02 ENCOUNTER — TELEPHONE (OUTPATIENT)
Dept: NEUROSURGERY | Facility: CLINIC | Age: 51
End: 2021-03-02

## 2021-03-02 ENCOUNTER — HOSPITAL ENCOUNTER (INPATIENT)
Facility: HOSPITAL | Age: 51
LOS: 6 days | Discharge: HOME | End: 2021-03-08
Attending: EMERGENCY MEDICINE | Admitting: NEUROLOGICAL SURGERY

## 2021-03-02 ENCOUNTER — ANESTHESIA (OUTPATIENT)
Dept: PERIOP | Facility: HOSPITAL | Age: 51
End: 2021-03-02

## 2021-03-02 DIAGNOSIS — F41.9 ANXIETY AND DEPRESSION: ICD-10-CM

## 2021-03-02 DIAGNOSIS — M43.16 SPONDYLOLISTHESIS OF LUMBAR REGION: ICD-10-CM

## 2021-03-02 DIAGNOSIS — M51.36 BULGING OF LUMBAR INTERVERTEBRAL DISC: Primary | ICD-10-CM

## 2021-03-02 DIAGNOSIS — F32.A ANXIETY AND DEPRESSION: ICD-10-CM

## 2021-03-02 DIAGNOSIS — M43.16 SPONDYLOLISTHESIS OF LUMBAR REGION: Primary | ICD-10-CM

## 2021-03-02 DIAGNOSIS — G95.89 MASS OF SPINAL CORD (HCC): ICD-10-CM

## 2021-03-02 DIAGNOSIS — M54.16 LUMBAR BACK PAIN WITH RADICULOPATHY AFFECTING RIGHT LOWER EXTREMITY: ICD-10-CM

## 2021-03-02 DIAGNOSIS — M79.604 RIGHT LEG PAIN: ICD-10-CM

## 2021-03-02 PROBLEM — M48.061 LUMBAR SPINAL STENOSIS: Status: ACTIVE | Noted: 2021-03-02

## 2021-03-02 PROBLEM — B00.9 HERPES: Status: ACTIVE | Noted: 2020-07-21

## 2021-03-02 LAB
ALBUMIN SERPL-MCNC: 4.1 G/DL (ref 3.5–5.2)
ALBUMIN/GLOB SERPL: 1.6 G/DL
ALP SERPL-CCNC: 65 U/L (ref 39–117)
ALT SERPL W P-5'-P-CCNC: 39 U/L (ref 1–33)
AMPHET+METHAMPHET UR QL: NEGATIVE
AMPHETAMINES UR QL: NEGATIVE
ANION GAP SERPL CALCULATED.3IONS-SCNC: 10 MMOL/L (ref 5–15)
AST SERPL-CCNC: 19 U/L (ref 1–32)
B-HCG UR QL: NEGATIVE
BARBITURATES UR QL SCN: NEGATIVE
BASOPHILS # BLD MANUAL: 0 10*3/MM3 (ref 0–0.2)
BASOPHILS NFR BLD AUTO: 0 % (ref 0–1.5)
BENZODIAZ UR QL SCN: NEGATIVE
BILIRUB SERPL-MCNC: <0.2 MG/DL (ref 0–1.2)
BUN SERPL-MCNC: 24 MG/DL (ref 6–20)
BUN/CREAT SERPL: 39.3 (ref 7–25)
BUPRENORPHINE SERPL-MCNC: NEGATIVE NG/ML
CALCIUM SPEC-SCNC: 9 MG/DL (ref 8.6–10.5)
CANNABINOIDS SERPL QL: POSITIVE
CHLORIDE SERPL-SCNC: 103 MMOL/L (ref 98–107)
CO2 SERPL-SCNC: 28 MMOL/L (ref 22–29)
COCAINE UR QL: NEGATIVE
CREAT SERPL-MCNC: 0.61 MG/DL (ref 0.57–1)
CRP SERPL-MCNC: <0.3 MG/DL (ref 0–0.5)
DEPRECATED RDW RBC AUTO: 49.5 FL (ref 37–54)
EOSINOPHIL # BLD MANUAL: 0 10*3/MM3 (ref 0–0.4)
EOSINOPHIL NFR BLD MANUAL: 0 % (ref 0.3–6.2)
ERYTHROCYTE [DISTWIDTH] IN BLOOD BY AUTOMATED COUNT: 15.3 % (ref 12.3–15.4)
ERYTHROCYTE [SEDIMENTATION RATE] IN BLOOD: 8 MM/HR (ref 0–30)
GFR SERPL CREATININE-BSD FRML MDRD: 103 ML/MIN/1.73
GLOBULIN UR ELPH-MCNC: 2.6 GM/DL
GLUCOSE SERPL-MCNC: 90 MG/DL (ref 65–99)
HCT VFR BLD AUTO: 37 % (ref 34–46.6)
HGB BLD-MCNC: 12 G/DL (ref 12–15.9)
INR PPP: 0.91 (ref 0.85–1.16)
LYMPHOCYTES # BLD MANUAL: 4.17 10*3/MM3 (ref 0.7–3.1)
LYMPHOCYTES NFR BLD MANUAL: 28 % (ref 19.6–45.3)
LYMPHOCYTES NFR BLD MANUAL: 5 % (ref 5–12)
MCH RBC QN AUTO: 28.9 PG (ref 26.6–33)
MCHC RBC AUTO-ENTMCNC: 32.4 G/DL (ref 31.5–35.7)
MCV RBC AUTO: 89.2 FL (ref 79–97)
METAMYELOCYTES NFR BLD MANUAL: 2 % (ref 0–0)
METHADONE UR QL SCN: NEGATIVE
MONOCYTES # BLD AUTO: 0.74 10*3/MM3 (ref 0.1–0.9)
NEUTROPHILS # BLD AUTO: 7.74 10*3/MM3 (ref 1.7–7)
NEUTROPHILS NFR BLD MANUAL: 49 % (ref 42.7–76)
NEUTS BAND NFR BLD MANUAL: 3 % (ref 0–5)
OPIATES UR QL: NEGATIVE
OXYCODONE UR QL SCN: NEGATIVE
PCP UR QL SCN: NEGATIVE
PLAT MORPH BLD: NORMAL
PLATELET # BLD AUTO: 451 10*3/MM3 (ref 140–450)
PMV BLD AUTO: 8.8 FL (ref 6–12)
POTASSIUM SERPL-SCNC: 3.9 MMOL/L (ref 3.5–5.2)
PROPOXYPH UR QL: NEGATIVE
PROT SERPL-MCNC: 6.7 G/DL (ref 6–8.5)
PROTHROMBIN TIME: 11.9 SECONDS (ref 11.5–14)
RBC # BLD AUTO: 4.15 10*6/MM3 (ref 3.77–5.28)
RBC MORPH BLD: NORMAL
SARS-COV-2 RDRP RESP QL NAA+PROBE: NORMAL
SODIUM SERPL-SCNC: 141 MMOL/L (ref 136–145)
TRICYCLICS UR QL SCN: NEGATIVE
VARIANT LYMPHS NFR BLD MANUAL: 13 % (ref 0–5)
WBC # BLD AUTO: 14.88 10*3/MM3 (ref 3.4–10.8)
WBC MORPH BLD: NORMAL

## 2021-03-02 PROCEDURE — 63267 EXCISE INTRSPINL LESION LMBR: CPT | Performed by: NEUROLOGICAL SURGERY

## 2021-03-02 PROCEDURE — 25010000002 FENTANYL CITRATE (PF) 100 MCG/2ML SOLUTION: Performed by: NEUROLOGICAL SURGERY

## 2021-03-02 PROCEDURE — 85025 COMPLETE CBC W/AUTO DIFF WBC: CPT | Performed by: PHYSICIAN ASSISTANT

## 2021-03-02 PROCEDURE — 25010000002 BUPRENORPHINE PER 0.1 MG: Performed by: NEUROLOGICAL SURGERY

## 2021-03-02 PROCEDURE — 25010000002 DEXAMETHASONE PER 1 MG: Performed by: NURSE ANESTHETIST, CERTIFIED REGISTERED

## 2021-03-02 PROCEDURE — 88311 DECALCIFY TISSUE: CPT | Performed by: NEUROLOGICAL SURGERY

## 2021-03-02 PROCEDURE — 99222 1ST HOSP IP/OBS MODERATE 55: CPT | Performed by: NURSE PRACTITIONER

## 2021-03-02 PROCEDURE — 76000 FLUOROSCOPY <1 HR PHYS/QHP: CPT

## 2021-03-02 PROCEDURE — 88304 TISSUE EXAM BY PATHOLOGIST: CPT | Performed by: NEUROLOGICAL SURGERY

## 2021-03-02 PROCEDURE — 25010000002 ONDANSETRON PER 1 MG: Performed by: NURSE ANESTHETIST, CERTIFIED REGISTERED

## 2021-03-02 PROCEDURE — 01NB0ZZ RELEASE LUMBAR NERVE, OPEN APPROACH: ICD-10-PCS | Performed by: NEUROLOGICAL SURGERY

## 2021-03-02 PROCEDURE — 25010000002 DEXAMETHASONE SODIUM PHOSPHATE 10 MG/ML SOLUTION 1 ML VIAL: Performed by: NEUROLOGICAL SURGERY

## 2021-03-02 PROCEDURE — 25010000002 DEXAMETHASONE SODIUM PHOSPHATE 100 MG/10ML SOLUTION: Performed by: PHYSICIAN ASSISTANT

## 2021-03-02 PROCEDURE — 25010000003 CEFAZOLIN IN DEXTROSE 2-4 GM/100ML-% SOLUTION: Performed by: NEUROLOGICAL SURGERY

## 2021-03-02 PROCEDURE — 25010000002 PROPOFOL 10 MG/ML EMULSION: Performed by: NURSE ANESTHETIST, CERTIFIED REGISTERED

## 2021-03-02 PROCEDURE — 80306 DRUG TEST PRSMV INSTRMNT: CPT | Performed by: PHYSICIAN ASSISTANT

## 2021-03-02 PROCEDURE — 87635 SARS-COV-2 COVID-19 AMP PRB: CPT | Performed by: EMERGENCY MEDICINE

## 2021-03-02 PROCEDURE — 25010000002 FENTANYL CITRATE (PF) 100 MCG/2ML SOLUTION: Performed by: NURSE ANESTHETIST, CERTIFIED REGISTERED

## 2021-03-02 PROCEDURE — 25010000002 HYDROMORPHONE 1 MG/ML SOLUTION: Performed by: EMERGENCY MEDICINE

## 2021-03-02 PROCEDURE — 99221 1ST HOSP IP/OBS SF/LOW 40: CPT | Performed by: NEUROLOGICAL SURGERY

## 2021-03-02 PROCEDURE — 85007 BL SMEAR W/DIFF WBC COUNT: CPT | Performed by: PHYSICIAN ASSISTANT

## 2021-03-02 PROCEDURE — 86140 C-REACTIVE PROTEIN: CPT | Performed by: PHYSICIAN ASSISTANT

## 2021-03-02 PROCEDURE — 99284 EMERGENCY DEPT VISIT MOD MDM: CPT

## 2021-03-02 PROCEDURE — 85652 RBC SED RATE AUTOMATED: CPT | Performed by: PHYSICIAN ASSISTANT

## 2021-03-02 PROCEDURE — 80053 COMPREHEN METABOLIC PANEL: CPT | Performed by: PHYSICIAN ASSISTANT

## 2021-03-02 PROCEDURE — 25010000002 PHENYLEPHRINE 10 MG/ML SOLUTION 1 ML VIAL: Performed by: NURSE ANESTHETIST, CERTIFIED REGISTERED

## 2021-03-02 PROCEDURE — 73502 X-RAY EXAM HIP UNI 2-3 VIEWS: CPT

## 2021-03-02 PROCEDURE — 81025 URINE PREGNANCY TEST: CPT | Performed by: ANESTHESIOLOGY

## 2021-03-02 PROCEDURE — 25010000002 METHYLPREDNISOLONE PER 40 MG: Performed by: NEUROLOGICAL SURGERY

## 2021-03-02 PROCEDURE — 85610 PROTHROMBIN TIME: CPT | Performed by: PHYSICIAN ASSISTANT

## 2021-03-02 DEVICE — FLOSEAL HEMOSTATIC MATRIX, 10ML
Type: IMPLANTABLE DEVICE | Site: SPINE LUMBAR | Status: FUNCTIONAL
Brand: FLOSEAL HEMOSTATIC MATRIX

## 2021-03-02 DEVICE — HEMOST ABS SURGIFOAM SZ100 8X12 10MM: Type: IMPLANTABLE DEVICE | Site: SPINE LUMBAR | Status: FUNCTIONAL

## 2021-03-02 RX ORDER — CHLORHEXIDINE GLUCONATE 4 G/100ML
SOLUTION TOPICAL
Qty: 120 ML | Refills: 0 | Status: SHIPPED | OUTPATIENT
Start: 2021-03-02 | End: 2021-03-08 | Stop reason: HOSPADM

## 2021-03-02 RX ORDER — METHYLPREDNISOLONE ACETATE 40 MG/ML
INJECTION, SUSPENSION INTRA-ARTICULAR; INTRALESIONAL; INTRAMUSCULAR; SOFT TISSUE AS NEEDED
Status: DISCONTINUED | OUTPATIENT
Start: 2021-03-02 | End: 2021-03-02 | Stop reason: HOSPADM

## 2021-03-02 RX ORDER — CEFAZOLIN SODIUM 2 G/100ML
2 INJECTION, SOLUTION INTRAVENOUS ONCE
Status: COMPLETED | OUTPATIENT
Start: 2021-03-02 | End: 2021-03-02

## 2021-03-02 RX ORDER — CEFAZOLIN SODIUM 2 G/100ML
2 INJECTION, SOLUTION INTRAVENOUS ONCE
Status: CANCELLED | OUTPATIENT
Start: 2021-03-02 | End: 2021-03-02

## 2021-03-02 RX ORDER — LIDOCAINE HYDROCHLORIDE AND EPINEPHRINE 5; 5 MG/ML; UG/ML
INJECTION, SOLUTION INFILTRATION; PERINEURAL AS NEEDED
Status: DISCONTINUED | OUTPATIENT
Start: 2021-03-02 | End: 2021-03-02 | Stop reason: HOSPADM

## 2021-03-02 RX ORDER — BUPIVACAINE HCL/0.9 % NACL/PF 0.125 %
PLASTIC BAG, INJECTION (ML) EPIDURAL AS NEEDED
Status: DISCONTINUED | OUTPATIENT
Start: 2021-03-02 | End: 2021-03-02 | Stop reason: SURG

## 2021-03-02 RX ORDER — NALOXONE HCL 0.4 MG/ML
0.4 VIAL (ML) INJECTION
Status: DISCONTINUED | OUTPATIENT
Start: 2021-03-02 | End: 2021-03-05

## 2021-03-02 RX ORDER — ONDANSETRON 4 MG/1
4 TABLET, FILM COATED ORAL EVERY 6 HOURS PRN
Status: DISCONTINUED | OUTPATIENT
Start: 2021-03-02 | End: 2021-03-05

## 2021-03-02 RX ORDER — HYDROCODONE BITARTRATE AND ACETAMINOPHEN 7.5; 325 MG/1; MG/1
1 TABLET ORAL ONCE
Status: COMPLETED | OUTPATIENT
Start: 2021-03-02 | End: 2021-03-02

## 2021-03-02 RX ORDER — SODIUM CHLORIDE, SODIUM LACTATE, POTASSIUM CHLORIDE, CALCIUM CHLORIDE 600; 310; 30; 20 MG/100ML; MG/100ML; MG/100ML; MG/100ML
9 INJECTION, SOLUTION INTRAVENOUS CONTINUOUS
Status: CANCELLED | OUTPATIENT
Start: 2021-03-02

## 2021-03-02 RX ORDER — DEXAMETHASONE SODIUM PHOSPHATE 4 MG/ML
8 INJECTION, SOLUTION INTRA-ARTICULAR; INTRALESIONAL; INTRAMUSCULAR; INTRAVENOUS; SOFT TISSUE ONCE AS NEEDED
Status: DISCONTINUED | OUTPATIENT
Start: 2021-03-02 | End: 2021-03-02 | Stop reason: HOSPADM

## 2021-03-02 RX ORDER — LIDOCAINE HYDROCHLORIDE 10 MG/ML
INJECTION, SOLUTION EPIDURAL; INFILTRATION; INTRACAUDAL; PERINEURAL AS NEEDED
Status: DISCONTINUED | OUTPATIENT
Start: 2021-03-02 | End: 2021-03-02 | Stop reason: SURG

## 2021-03-02 RX ORDER — IBUPROFEN 800 MG/1
800 TABLET ORAL ONCE
Status: COMPLETED | OUTPATIENT
Start: 2021-03-02 | End: 2021-03-02

## 2021-03-02 RX ORDER — HYDROCODONE BITARTRATE AND ACETAMINOPHEN 7.5; 325 MG/1; MG/1
1 TABLET ORAL ONCE
Status: CANCELLED | OUTPATIENT
Start: 2021-03-02 | End: 2021-03-02

## 2021-03-02 RX ORDER — PROPOFOL 10 MG/ML
VIAL (ML) INTRAVENOUS AS NEEDED
Status: DISCONTINUED | OUTPATIENT
Start: 2021-03-02 | End: 2021-03-02 | Stop reason: SURG

## 2021-03-02 RX ORDER — SODIUM CHLORIDE 0.9 % (FLUSH) 0.9 %
10 SYRINGE (ML) INJECTION AS NEEDED
Status: DISCONTINUED | OUTPATIENT
Start: 2021-03-02 | End: 2021-03-08 | Stop reason: HOSPADM

## 2021-03-02 RX ORDER — ONDANSETRON 2 MG/ML
4 INJECTION INTRAMUSCULAR; INTRAVENOUS EVERY 6 HOURS PRN
Status: DISCONTINUED | OUTPATIENT
Start: 2021-03-02 | End: 2021-03-05

## 2021-03-02 RX ORDER — AMOXICILLIN 250 MG
1 CAPSULE ORAL NIGHTLY PRN
Status: DISCONTINUED | OUTPATIENT
Start: 2021-03-02 | End: 2021-03-05

## 2021-03-02 RX ORDER — DEXAMETHASONE SODIUM PHOSPHATE 4 MG/ML
INJECTION, SOLUTION INTRA-ARTICULAR; INTRALESIONAL; INTRAMUSCULAR; INTRAVENOUS; SOFT TISSUE AS NEEDED
Status: DISCONTINUED | OUTPATIENT
Start: 2021-03-02 | End: 2021-03-02 | Stop reason: SURG

## 2021-03-02 RX ORDER — ACETAMINOPHEN 325 MG/1
650 TABLET ORAL ONCE
Status: COMPLETED | OUTPATIENT
Start: 2021-03-02 | End: 2021-03-02

## 2021-03-02 RX ORDER — ROPINIROLE 2 MG/1
2 TABLET, FILM COATED ORAL 2 TIMES DAILY
Status: DISCONTINUED | OUTPATIENT
Start: 2021-03-02 | End: 2021-03-08 | Stop reason: HOSPADM

## 2021-03-02 RX ORDER — ONDANSETRON 2 MG/ML
4 INJECTION INTRAMUSCULAR; INTRAVENOUS ONCE AS NEEDED
Status: DISCONTINUED | OUTPATIENT
Start: 2021-03-02 | End: 2021-03-02 | Stop reason: HOSPADM

## 2021-03-02 RX ORDER — ONDANSETRON 2 MG/ML
INJECTION INTRAMUSCULAR; INTRAVENOUS AS NEEDED
Status: DISCONTINUED | OUTPATIENT
Start: 2021-03-02 | End: 2021-03-02 | Stop reason: SURG

## 2021-03-02 RX ORDER — SODIUM CHLORIDE 0.9 % (FLUSH) 0.9 %
10 SYRINGE (ML) INJECTION AS NEEDED
Status: DISCONTINUED | OUTPATIENT
Start: 2021-03-02 | End: 2021-03-05

## 2021-03-02 RX ORDER — MAGNESIUM HYDROXIDE 1200 MG/15ML
LIQUID ORAL AS NEEDED
Status: DISCONTINUED | OUTPATIENT
Start: 2021-03-02 | End: 2021-03-02 | Stop reason: HOSPADM

## 2021-03-02 RX ORDER — HYDROMORPHONE HYDROCHLORIDE 1 MG/ML
0.5 INJECTION, SOLUTION INTRAMUSCULAR; INTRAVENOUS; SUBCUTANEOUS
Status: DISCONTINUED | OUTPATIENT
Start: 2021-03-02 | End: 2021-03-02 | Stop reason: HOSPADM

## 2021-03-02 RX ORDER — FAMOTIDINE 20 MG/1
20 TABLET, FILM COATED ORAL
Status: COMPLETED | OUTPATIENT
Start: 2021-03-02 | End: 2021-03-02

## 2021-03-02 RX ORDER — SODIUM CHLORIDE 0.9 % (FLUSH) 0.9 %
3 SYRINGE (ML) INJECTION EVERY 12 HOURS SCHEDULED
Status: DISCONTINUED | OUTPATIENT
Start: 2021-03-02 | End: 2021-03-08 | Stop reason: HOSPADM

## 2021-03-02 RX ORDER — ACETAMINOPHEN 325 MG/1
650 TABLET ORAL EVERY 4 HOURS PRN
Status: DISCONTINUED | OUTPATIENT
Start: 2021-03-02 | End: 2021-03-04

## 2021-03-02 RX ORDER — FENTANYL CITRATE 50 UG/ML
INJECTION, SOLUTION INTRAMUSCULAR; INTRAVENOUS AS NEEDED
Status: DISCONTINUED | OUTPATIENT
Start: 2021-03-02 | End: 2021-03-02 | Stop reason: SURG

## 2021-03-02 RX ORDER — GABAPENTIN 300 MG/1
300 CAPSULE ORAL EVERY 8 HOURS SCHEDULED
Status: DISCONTINUED | OUTPATIENT
Start: 2021-03-02 | End: 2021-03-05

## 2021-03-02 RX ORDER — GLYCOPYRROLATE 0.2 MG/ML
INJECTION INTRAMUSCULAR; INTRAVENOUS AS NEEDED
Status: DISCONTINUED | OUTPATIENT
Start: 2021-03-02 | End: 2021-03-02 | Stop reason: SURG

## 2021-03-02 RX ORDER — HYDROMORPHONE HYDROCHLORIDE 1 MG/ML
0.5 INJECTION, SOLUTION INTRAMUSCULAR; INTRAVENOUS; SUBCUTANEOUS
Status: DISCONTINUED | OUTPATIENT
Start: 2021-03-02 | End: 2021-03-05

## 2021-03-02 RX ORDER — EPHEDRINE SULFATE 50 MG/ML
INJECTION, SOLUTION INTRAVENOUS AS NEEDED
Status: DISCONTINUED | OUTPATIENT
Start: 2021-03-02 | End: 2021-03-02 | Stop reason: SURG

## 2021-03-02 RX ORDER — IBUPROFEN 800 MG/1
800 TABLET ORAL
Status: DISCONTINUED | OUTPATIENT
Start: 2021-03-02 | End: 2021-03-04

## 2021-03-02 RX ORDER — VALACYCLOVIR HYDROCHLORIDE 500 MG/1
1000 TABLET, FILM COATED ORAL DAILY
Status: DISCONTINUED | OUTPATIENT
Start: 2021-03-02 | End: 2021-03-02

## 2021-03-02 RX ORDER — SODIUM CHLORIDE, SODIUM LACTATE, POTASSIUM CHLORIDE, CALCIUM CHLORIDE 600; 310; 30; 20 MG/100ML; MG/100ML; MG/100ML; MG/100ML
INJECTION, SOLUTION INTRAVENOUS CONTINUOUS PRN
Status: DISCONTINUED | OUTPATIENT
Start: 2021-03-02 | End: 2021-03-02 | Stop reason: SURG

## 2021-03-02 RX ORDER — HYDROCODONE BITARTRATE AND ACETAMINOPHEN 7.5; 325 MG/1; MG/1
1 TABLET ORAL EVERY 6 HOURS PRN
Status: DISCONTINUED | OUTPATIENT
Start: 2021-03-02 | End: 2021-03-05

## 2021-03-02 RX ORDER — IBUPROFEN 800 MG/1
800 TABLET ORAL ONCE
Status: CANCELLED | OUTPATIENT
Start: 2021-03-02 | End: 2021-03-02

## 2021-03-02 RX ORDER — VENLAFAXINE HYDROCHLORIDE 75 MG/1
150 CAPSULE, EXTENDED RELEASE ORAL DAILY
Status: DISCONTINUED | OUTPATIENT
Start: 2021-03-02 | End: 2021-03-08 | Stop reason: HOSPADM

## 2021-03-02 RX ORDER — FENTANYL CITRATE 50 UG/ML
50 INJECTION, SOLUTION INTRAMUSCULAR; INTRAVENOUS
Status: DISCONTINUED | OUTPATIENT
Start: 2021-03-02 | End: 2021-03-02 | Stop reason: HOSPADM

## 2021-03-02 RX ORDER — METHOCARBAMOL 750 MG/1
750 TABLET, FILM COATED ORAL EVERY 8 HOURS SCHEDULED
Status: DISCONTINUED | OUTPATIENT
Start: 2021-03-02 | End: 2021-03-05

## 2021-03-02 RX ORDER — FENTANYL CITRATE 50 UG/ML
50 INJECTION, SOLUTION INTRAMUSCULAR; INTRAVENOUS ONCE
Status: COMPLETED | OUTPATIENT
Start: 2021-03-02 | End: 2021-03-02

## 2021-03-02 RX ORDER — ROCURONIUM BROMIDE 10 MG/ML
INJECTION, SOLUTION INTRAVENOUS AS NEEDED
Status: DISCONTINUED | OUTPATIENT
Start: 2021-03-02 | End: 2021-03-02 | Stop reason: SURG

## 2021-03-02 RX ORDER — VALACYCLOVIR HYDROCHLORIDE 500 MG/1
1000 TABLET, FILM COATED ORAL DAILY
Status: DISCONTINUED | OUTPATIENT
Start: 2021-03-02 | End: 2021-03-08 | Stop reason: HOSPADM

## 2021-03-02 RX ORDER — ACETAMINOPHEN 325 MG/1
650 TABLET ORAL ONCE
Status: CANCELLED | OUTPATIENT
Start: 2021-03-02 | End: 2021-03-02

## 2021-03-02 RX ORDER — DEXAMETHASONE 4 MG/1
4 TABLET ORAL 2 TIMES DAILY WITH MEALS
Status: DISCONTINUED | OUTPATIENT
Start: 2021-03-02 | End: 2021-03-02

## 2021-03-02 RX ORDER — ALBUTEROL SULFATE 90 UG/1
2 AEROSOL, METERED RESPIRATORY (INHALATION) EVERY 4 HOURS PRN
Status: DISCONTINUED | OUTPATIENT
Start: 2021-03-02 | End: 2021-03-08 | Stop reason: HOSPADM

## 2021-03-02 RX ORDER — FAMOTIDINE 20 MG/1
20 TABLET, FILM COATED ORAL
Status: CANCELLED | OUTPATIENT
Start: 2021-03-02

## 2021-03-02 RX ORDER — SODIUM CHLORIDE, SODIUM LACTATE, POTASSIUM CHLORIDE, CALCIUM CHLORIDE 600; 310; 30; 20 MG/100ML; MG/100ML; MG/100ML; MG/100ML
9 INJECTION, SOLUTION INTRAVENOUS CONTINUOUS
Status: DISCONTINUED | OUTPATIENT
Start: 2021-03-02 | End: 2021-03-08 | Stop reason: HOSPADM

## 2021-03-02 RX ORDER — DOCUSATE SODIUM 100 MG/1
100 CAPSULE, LIQUID FILLED ORAL 2 TIMES DAILY PRN
Status: DISCONTINUED | OUTPATIENT
Start: 2021-03-02 | End: 2021-03-05

## 2021-03-02 RX ADMIN — SODIUM CHLORIDE, POTASSIUM CHLORIDE, SODIUM LACTATE AND CALCIUM CHLORIDE: 600; 310; 30; 20 INJECTION, SOLUTION INTRAVENOUS at 14:10

## 2021-03-02 RX ADMIN — Medication 80 MCG: at 14:32

## 2021-03-02 RX ADMIN — ACETAMINOPHEN 650 MG: 325 TABLET ORAL at 13:51

## 2021-03-02 RX ADMIN — IBUPROFEN 800 MG: 800 TABLET, FILM COATED ORAL at 13:51

## 2021-03-02 RX ADMIN — SODIUM CHLORIDE, POTASSIUM CHLORIDE, SODIUM LACTATE AND CALCIUM CHLORIDE 9 ML/HR: 600; 310; 30; 20 INJECTION, SOLUTION INTRAVENOUS at 13:48

## 2021-03-02 RX ADMIN — CEFAZOLIN SODIUM 2 G: 2 INJECTION, SOLUTION INTRAVENOUS at 14:10

## 2021-03-02 RX ADMIN — FENTANYL CITRATE 100 MCG: 50 INJECTION, SOLUTION INTRAMUSCULAR; INTRAVENOUS at 14:14

## 2021-03-02 RX ADMIN — METHOCARBAMOL 750 MG: 750 TABLET ORAL at 21:52

## 2021-03-02 RX ADMIN — Medication 80 MCG: at 15:00

## 2021-03-02 RX ADMIN — IBUPROFEN 800 MG: 800 TABLET ORAL at 18:32

## 2021-03-02 RX ADMIN — HYDROCODONE BITARTRATE AND ACETAMINOPHEN 1 TABLET: 7.5; 325 TABLET ORAL at 18:31

## 2021-03-02 RX ADMIN — HYDROMORPHONE HYDROCHLORIDE 1 MG: 1 INJECTION, SOLUTION INTRAMUSCULAR; INTRAVENOUS; SUBCUTANEOUS at 11:50

## 2021-03-02 RX ADMIN — GLYCOPYRROLATE 0.2 MG: 0.4 INJECTION INTRAMUSCULAR; INTRAVENOUS at 14:41

## 2021-03-02 RX ADMIN — DEXAMETHASONE SODIUM PHOSPHATE 10 MG: 10 INJECTION, SOLUTION INTRAMUSCULAR; INTRAVENOUS at 12:34

## 2021-03-02 RX ADMIN — ROCURONIUM BROMIDE 40 MG: 10 INJECTION INTRAVENOUS at 14:14

## 2021-03-02 RX ADMIN — DEXAMETHASONE SODIUM PHOSPHATE 8 MG: 4 INJECTION, SOLUTION INTRA-ARTICULAR; INTRALESIONAL; INTRAMUSCULAR; INTRAVENOUS; SOFT TISSUE at 14:29

## 2021-03-02 RX ADMIN — FAMOTIDINE 20 MG: 20 TABLET, FILM COATED ORAL at 13:51

## 2021-03-02 RX ADMIN — GABAPENTIN 300 MG: 300 CAPSULE ORAL at 18:31

## 2021-03-02 RX ADMIN — SODIUM CHLORIDE, POTASSIUM CHLORIDE, SODIUM LACTATE AND CALCIUM CHLORIDE: 600; 310; 30; 20 INJECTION, SOLUTION INTRAVENOUS at 15:16

## 2021-03-02 RX ADMIN — ROPINIROLE HYDROCHLORIDE 2 MG: 2 TABLET, FILM COATED ORAL at 20:30

## 2021-03-02 RX ADMIN — VENLAFAXINE HYDROCHLORIDE 150 MG: 75 CAPSULE, EXTENDED RELEASE ORAL at 18:31

## 2021-03-02 RX ADMIN — ONDANSETRON 4 MG: 2 INJECTION INTRAMUSCULAR; INTRAVENOUS at 14:29

## 2021-03-02 RX ADMIN — EPHEDRINE SULFATE 10 MG: 50 INJECTION, SOLUTION INTRAVENOUS at 14:39

## 2021-03-02 RX ADMIN — HYDROCODONE BITARTRATE AND ACETAMINOPHEN 1 TABLET: 7.5; 325 TABLET ORAL at 13:51

## 2021-03-02 RX ADMIN — Medication 80 MCG: at 14:41

## 2021-03-02 RX ADMIN — GABAPENTIN 300 MG: 300 CAPSULE ORAL at 21:52

## 2021-03-02 RX ADMIN — PHENYLEPHRINE HYDROCHLORIDE 0.2 MCG/KG/MIN: 10 INJECTION INTRAVENOUS at 15:04

## 2021-03-02 RX ADMIN — LIDOCAINE HYDROCHLORIDE 50 MG: 10 INJECTION, SOLUTION EPIDURAL; INFILTRATION; INTRACAUDAL; PERINEURAL at 14:14

## 2021-03-02 RX ADMIN — Medication 100 MCG: at 14:39

## 2021-03-02 RX ADMIN — EPHEDRINE SULFATE 10 MG: 50 INJECTION, SOLUTION INTRAVENOUS at 14:35

## 2021-03-02 RX ADMIN — ROCURONIUM BROMIDE 10 MG: 10 INJECTION INTRAVENOUS at 15:04

## 2021-03-02 RX ADMIN — PROPOFOL 200 MG: 10 INJECTION, EMULSION INTRAVENOUS at 14:14

## 2021-03-02 RX ADMIN — VALACYCLOVIR HYDROCHLORIDE 1000 MG: 500 TABLET, FILM COATED ORAL at 18:31

## 2021-03-02 RX ADMIN — FENTANYL CITRATE 50 MCG: 50 INJECTION, SOLUTION INTRAMUSCULAR; INTRAVENOUS at 12:33

## 2021-03-02 NOTE — ANESTHESIA PREPROCEDURE EVALUATION
Anesthesia Evaluation     Patient summary reviewed and Nursing notes reviewed   no history of anesthetic complications:  NPO Solid Status: > 8 hours  NPO Liquid Status: > 8 hours           Airway   Mallampati: II  TM distance: >3 FB  Neck ROM: full  No difficulty expected  Dental      Pulmonary - negative pulmonary ROS and normal exam   Cardiovascular - normal exam    (+) hypertension,       Neuro/Psych  (+) numbness, psychiatric history,     GI/Hepatic/Renal/Endo    (+) morbid obesity,      Musculoskeletal     Abdominal    Substance History      OB/GYN          Other   arthritis,                    Anesthesia Plan    ASA 3 - emergent     general     intravenous induction     Anesthetic plan, all risks, benefits, and alternatives have been provided, discussed and informed consent has been obtained with: patient.    Plan discussed with CRNA.

## 2021-03-02 NOTE — TELEPHONE ENCOUNTER
Patient well-known to practice and was in the hospital over the weekend.  Patient had interval development of a very large disc herniation/facet cyst in the right side of foramen completely occupying the space where the L5 nerve root is running.     Patient was discharged yesterday with the understanding that she was going to be with her parents and daughter in [place].  We will going to try to manage pain outpatient to get on the schedule with Dr. Dawson.    Patient calls today alone and having extreme pain in the L5 distribution.  Patient states that foot is inverted and unable to dorsiflex.  I asked patient if she could make it to the office to be seen and she states that she is not going to be able to drive herself in the shape she is in and there is no one to bring her.  Patient was given the option to stay in the hospital to be scheduled for Thursday for a right for 5 discectomy/fusion per Dr. Dawson's plan but patient elected to go home.    I asked the patient to come and be evaluated today but she states that she cannot drive herself and she has no one to bring her.  Patient stated she is going to call the EMS service and be brought to [facility] ER.  I told her we would evaluate her when she got here.

## 2021-03-02 NOTE — OUTREACH NOTE
Call Center TCM Note      Responses   [facility] patient discharged from?  [place]   Does the patient have one of the following disease processes/diagnoses(primary or secondary)?  Other   TCM attempt successful?  No   Unsuccessful attempts  Attempt 1          Avtar Ma RN    3/2/2021, 08:59 EST
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
## 2021-03-02 NOTE — OUTREACH NOTE
Call Center TCM Note      Responses   [facility] patient discharged from?  [place]   Does the patient have one of the following disease processes/diagnoses(primary or secondary)?  Other   TCM attempt successful?  No   Unsuccessful attempts  Attempt 2   Change in Health Status  Readmitted [Readmitted to [facility]]          Avtar Ma RN    3/2/2021, 14:32 EST

## 2021-03-02 NOTE — PAYOR COMM NOTE
"Christina Ellis RN Utilization Review [phone]  Fax # [phone]  Ref # [id number]    Please note discharge date.    Kamlesh Smith (50 y.o. Female)     Date of Birth Social Security Number Address Home Phone MRN    1970  [id number] [facility] [address] [phone] [id number]    Christian Marital Status          [place]ru[place]        Admission Date Admission Type Admitting Provider Attending Provider Department, Room/Bed    2/27/21 Emergency Ron Dennison MD  [facility] 3H, S374/1    Discharge Date Discharge Disposition Discharge Destination        3/1/2021 Home or Self Care              Attending Provider: (none)   Allergies: Aripiprazole    Isolation: None   Infection: None   Code Status: Prior    Ht: 167.6 cm (66\")   Wt: 93.8 kg (206 lb 14.4 oz)    Admission Cmt: None   Principal Problem: Sciatica [M54.30]                 Active Insurance as of 2/27/2021     Primary Coverage     Payor Plan Insurance Group Employer/Plan Group    [employer]HE[employer] MEDICAID ANTH MEDICAID [id number]     Payor Plan Address Payor Plan Phone Number Payor Plan Fax Number Effective Dates    [address] [address] [phone]  4/8/2020 - None Entered    [facility] [id number]       Subscriber Name Subscriber Birth Date Member ID       KAMLESH SMITH 1970 [id number]                 Emergency Contacts      (Rel.) Home Phone Work Phone Mobile Phone    leighton gomes (Son) [phone] -- --    campanicole dubon (Mother) [phone] -- --               Physician Progress Notes (most recent note)      Meghana Abarca PA-C at 03/01/21 0940     Attestation signed by Kingsley Dawson MD at 03/01/21 1028    I have reviewed this documentation and agree.                  NEUROSURGERY PROGRESS NOTE    Interval History:   Hospital day #2: Intractable pain, L4-5 instability  Patient reports pain is minimally improved.  Reports continued weakness of right foot.  Pain and sensory " "alteration of her right groin region improving.  Reports some difficulty initiating urination, however able to feel sensation and voids independently on bedpan.    Vital Signs  Blood pressure 141/86, pulse 69, temperature 97.9 °F (36.6 °C), temperature source Oral, resp. rate 16, height 167.6 cm (66\"), weight 93.8 kg (206 lb 14.4 oz), SpO2 94 %, not currently breastfeeding.    Physical Exam:  AXOX3  Patient is tearful and in obvious discomfort  Unable to perform right foot dorsiflexion secondary to pain.   Right plantarflexion, knee extension and hip flexion is intact  Decreased sensation over lateral right calf and anterior foot  LLE strength and sensation well intact     Results Review:    I/O last 3 completed shifts:  In: 1180 [P.O.:1080; IV Piggyback:100]  Out: 4050 [Urine:4050]  No intake/output data recorded.    Assessment/Plan:   Discussed options with patient at this time. She is making minimal improvement. Due to scheduling this week we cannot add patient on for surgery. Patient has agreed to be discharged today. She states that her daughter can come and care for her until surgery is scheduled. Likely this will be early next week. I have added soma in place of flexeril and increased patient's gabapentin today.    Meghana Abarca PA-C  03/01/21  09:40 EST    Electronically signed by Kingsley Dawson MD at 03/01/21 1028       Discharge Summary    No notes of this type exist for this encounter.         Discharge Order (From admission, onward)     Start     Ordered    03/01/21 1328  Discharge patient  Once     Expected Discharge Date: 03/01/21    Discharge Disposition: Home or Self Care    Physician of Record for Attribution - Please select from Treatment Team: WATSON BETANCOURT [[id number]]    Review needed by CMO to determine Physician of Record: Yes       Question Answer Comment   Physician of Record for Attribution - Please select from Treatment Team WATSON BETANCOURT    Review needed by CMO to determine " Physician of Record Yes        03/01/21 9442

## 2021-03-02 NOTE — ANESTHESIA PROCEDURE NOTES
Airway  Urgency: elective    Date/Time: 3/2/2021 2:18 PM  Airway not difficult    General Information and Staff    Patient location during procedure: OR  CRNA: Demetrice Monteiro CRNA    Indications and Patient Condition  Indications for airway management: airway protection    Preoxygenated: yes  MILS not maintained throughout  Mask difficulty assessment: 1 - vent by mask    Final Airway Details  Final airway type: endotracheal airway      Successful airway: ETT  Cuffed: yes   Successful intubation technique: direct laryngoscopy  Endotracheal tube insertion site: oral  Blade: Jean Carlos  Blade size: 3  ETT size (mm): 7.0  Cormack-Lehane Classification: grade I - full view of glottis  Placement verified by: chest auscultation and capnometry   Measured from: lips  ETT/EBT  to lips (cm): 20  Number of attempts at approach: 1  Assessment: lips, teeth, and gum same as pre-op and atraumatic intubation    Additional Comments  Negative epigastric sounds, Breath sound equal bilaterally with symmetric chest rise and fall

## 2021-03-02 NOTE — TELEPHONE ENCOUNTER
Hub staff attempted to follow warm transfer process and was unsuccessful     Caller: Shahana Cristobal    Relationship to patient: Self    Best call back number: [phone]    Patient is needing: PATIENT CALLED REQUESTING FURTHER ASSISTANCE REGARDING SEVERE BACK PAIN. PATIENT WAS INTENSELY CRYING IN [place] & UNABLE TO ANSWER ANY QUESTIONS DUE TO CONSTANT CRYING. PATIENT REQUESTING SOME MEDS FOR SOME RELIEF WHILE AWAITING TO BE SCHEDULED FOR SURGERY. ATTEMPTED TO WARM TRANSFER AT 8:24AM AS A CLINICAL CALL BUT RONI STATES THAT OFFICE DOES NOT OFFICIALLY OPEN UNTIL 8:30AM & CLINICAL STAFF UNAVAILABLE AT THIS TIME. PATIENT INQUIRING IF SHE NEEDS TO GO TO HOSPITAL FIRST?     PATIENT CAN BE CONTACTED WITH FURTHER INSTRUCTION.
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
## 2021-03-03 LAB
ANION GAP SERPL CALCULATED.3IONS-SCNC: 8 MMOL/L (ref 5–15)
BUN SERPL-MCNC: 13 MG/DL (ref 6–20)
BUN/CREAT SERPL: 27.7 (ref 7–25)
CALCIUM SPEC-SCNC: 8.6 MG/DL (ref 8.6–10.5)
CHLORIDE SERPL-SCNC: 103 MMOL/L (ref 98–107)
CO2 SERPL-SCNC: 28 MMOL/L (ref 22–29)
CREAT SERPL-MCNC: 0.47 MG/DL (ref 0.57–1)
DEPRECATED RDW RBC AUTO: 51.2 FL (ref 37–54)
ERYTHROCYTE [DISTWIDTH] IN BLOOD BY AUTOMATED COUNT: 15.7 % (ref 12.3–15.4)
GFR SERPL CREATININE-BSD FRML MDRD: 140 ML/MIN/1.73
GLUCOSE SERPL-MCNC: 101 MG/DL (ref 65–99)
HCT VFR BLD AUTO: 34.5 % (ref 34–46.6)
HGB BLD-MCNC: 11 G/DL (ref 12–15.9)
MCH RBC QN AUTO: 28.6 PG (ref 26.6–33)
MCHC RBC AUTO-ENTMCNC: 31.9 G/DL (ref 31.5–35.7)
MCV RBC AUTO: 89.8 FL (ref 79–97)
PLATELET # BLD AUTO: 412 10*3/MM3 (ref 140–450)
PMV BLD AUTO: 9.3 FL (ref 6–12)
POTASSIUM SERPL-SCNC: 4.4 MMOL/L (ref 3.5–5.2)
RBC # BLD AUTO: 3.84 10*6/MM3 (ref 3.77–5.28)
SODIUM SERPL-SCNC: 139 MMOL/L (ref 136–145)
WBC # BLD AUTO: 14.33 10*3/MM3 (ref 3.4–10.8)

## 2021-03-03 PROCEDURE — 99232 SBSQ HOSP IP/OBS MODERATE 35: CPT | Performed by: NURSE PRACTITIONER

## 2021-03-03 PROCEDURE — 97162 PT EVAL MOD COMPLEX 30 MIN: CPT

## 2021-03-03 PROCEDURE — 97116 GAIT TRAINING THERAPY: CPT

## 2021-03-03 PROCEDURE — 80048 BASIC METABOLIC PNL TOTAL CA: CPT | Performed by: NURSE PRACTITIONER

## 2021-03-03 PROCEDURE — 85027 COMPLETE CBC AUTOMATED: CPT | Performed by: NURSE PRACTITIONER

## 2021-03-03 PROCEDURE — 99024 POSTOP FOLLOW-UP VISIT: CPT | Performed by: NEUROLOGICAL SURGERY

## 2021-03-03 PROCEDURE — 97110 THERAPEUTIC EXERCISES: CPT

## 2021-03-03 RX ORDER — PSEUDOEPHEDRINE HCL 30 MG
100 TABLET ORAL 2 TIMES DAILY PRN
Qty: 60 EACH | Refills: 1 | Status: CANCELLED | OUTPATIENT
Start: 2021-03-03

## 2021-03-03 RX ORDER — HYDROCODONE BITARTRATE AND ACETAMINOPHEN 7.5; 325 MG/1; MG/1
1 TABLET ORAL EVERY 6 HOURS PRN
Qty: 40 TABLET | Refills: 0 | Status: CANCELLED | OUTPATIENT
Start: 2021-03-03 | End: 2021-03-09

## 2021-03-03 RX ORDER — METHOCARBAMOL 750 MG/1
750 TABLET, FILM COATED ORAL 3 TIMES DAILY
Qty: 60 TABLET | Refills: 0 | Status: SHIPPED | OUTPATIENT
Start: 2021-03-03 | End: 2021-03-09

## 2021-03-03 RX ORDER — CALCIUM CARBONATE 200(500)MG
2 TABLET,CHEWABLE ORAL 3 TIMES DAILY PRN
Status: DISCONTINUED | OUTPATIENT
Start: 2021-03-03 | End: 2021-03-05

## 2021-03-03 RX ADMIN — SODIUM CHLORIDE, PRESERVATIVE FREE 3 ML: 5 INJECTION INTRAVENOUS at 20:16

## 2021-03-03 RX ADMIN — ANTACID TABLETS 1 TABLET: 500 TABLET, CHEWABLE ORAL at 21:57

## 2021-03-03 RX ADMIN — GABAPENTIN 300 MG: 300 CAPSULE ORAL at 21:53

## 2021-03-03 RX ADMIN — VENLAFAXINE HYDROCHLORIDE 150 MG: 75 CAPSULE, EXTENDED RELEASE ORAL at 08:30

## 2021-03-03 RX ADMIN — IBUPROFEN 800 MG: 800 TABLET ORAL at 17:31

## 2021-03-03 RX ADMIN — METHOCARBAMOL 750 MG: 750 TABLET ORAL at 14:31

## 2021-03-03 RX ADMIN — GABAPENTIN 300 MG: 300 CAPSULE ORAL at 14:31

## 2021-03-03 RX ADMIN — ROPINIROLE HYDROCHLORIDE 2 MG: 2 TABLET, FILM COATED ORAL at 08:30

## 2021-03-03 RX ADMIN — SODIUM CHLORIDE, PRESERVATIVE FREE 3 ML: 5 INJECTION INTRAVENOUS at 08:31

## 2021-03-03 RX ADMIN — METHOCARBAMOL 750 MG: 750 TABLET ORAL at 05:42

## 2021-03-03 RX ADMIN — METHOCARBAMOL 750 MG: 750 TABLET ORAL at 21:53

## 2021-03-03 RX ADMIN — IBUPROFEN 800 MG: 800 TABLET ORAL at 12:28

## 2021-03-03 RX ADMIN — VALACYCLOVIR HYDROCHLORIDE 1000 MG: 500 TABLET, FILM COATED ORAL at 08:30

## 2021-03-03 RX ADMIN — IBUPROFEN 800 MG: 800 TABLET ORAL at 08:30

## 2021-03-03 RX ADMIN — ROPINIROLE HYDROCHLORIDE 2 MG: 2 TABLET, FILM COATED ORAL at 20:15

## 2021-03-03 RX ADMIN — GABAPENTIN 300 MG: 300 CAPSULE ORAL at 05:42

## 2021-03-03 NOTE — OUTREACH NOTE
Medical Week 2 Survey      Responses   [facility] patient discharged from?  [place]   Does the patient have one of the following disease processes/diagnoses(primary or secondary)?  Other   Week 2 attempt successful?  No   Revoke  Readmitted          Kia Hobbs RN
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
## 2021-03-03 NOTE — OP NOTE
Operation note      Preoperative diagnosis  Right-sided L4-5 synovial cyst intractable pain foot drop with progression    Postoperative diagnosis same    Procedures performed  Right-sided L4-5 lamina foraminotomy    Right-sided resection synovial cyst    Right-sided hemilaminectomy L5 cephalad down with generous foraminotomy exiting foramina L5 under the pedicle      Surgeon: Nicolas Jefferson MD     Assistants: Jeet Trejo my physician's assistant was present and personally scrubbed the entirety of the procedure, they assisted suctioning, retraction, approach, and closure of the case.    Anesthesia: Normal endotracheal anesthesia    ASA Class: 2    Massive synovial cyst with compression erythema of the nerve sent for pathology    10 cc blood loss microscope used      This is a patient with a complex presentation history who presented with a foot drop and paralysis essentially below the leg with inability to move her leg she presented with a history of chronic spondylolisthesis I discussed extensively the complexity of her disease as well as that with her mother and prognosis outcomes.    Procedure in detail after formal written consent was obtained the patient was taken to the operating room endotracheally intubated given preoperative antimicrobial prophylaxis they were prepped and draped in the usual sterile manner all bony prominences and genitalia were padded to prevent neurologic injury.    At this point in time the patient was given local anesthesia at the operative level fluoroscopic guidance confirmed as 45 the correct level and tubular dilation system was placed on the lamina facet complex to ensure adequate exposure.    At this point time the microscope was used to visualize the exposure a hemilaminotomy and partial medial facetectomy was performed the yellow ligament was then identified and removed the traversing nerve root was identified and the disc space was identified as well by gentle  mobilization of the nerve root.  The ligament was noted to be very thickened as the beginning of the synovial cyst was present under the shoulder of the exiting L5 nerve root    The medial foraminotomies was completed and the nerve was still not evident given the thickness of the large synovial cyst emanating from the medial facet complex and partial medial facetectomy was performed and then extension and essentially hemilaminotomy of the L5 cephalad lamina was performed tracing this nerve all the way under the L5 pedicle curved Kerrison was used to undercut the pedicle facet complex in this area to create adequate decompression the bones were all intact and a ball probe could pass under the foramina easily the nerve root was decompressed well hemostasis was maintained    Paraspinal musculature was infiltrated with Marcaine and a cocktail of buprenorphine dexamethasone    Some Depo-Medrol was placed over this highly inflamed and scarred down nerve root    Patient woke from anesthesia was doing better in terms of pain control last evening

## 2021-03-03 NOTE — PROGRESS NOTES
Discharge Planning Assessment  [facility]     Patient Name: Shahana Cristobal  MRN: [id number]  Today's Date: 3/3/2021    Admit Date: 3/2/2021    Discharge Needs Assessment     Row Name 03/03/21 0851       Living Environment    Lives With  alone    Current Living Arrangements  home/apartment/condo    Primary Care Provided by  self    Provides Primary Care For  no one    Family Caregiver if Needed  child(leonard), adult    Family Caregiver Names  leighton gomes - Relation: Son - Home: [phone]    Quality of Family Relationships  helpful    Able to Return to Prior Arrangements  yes       Resource/Environmental Concerns    Resource/Environmental Concerns  none    Transportation Concerns  car, none       Transition Planning    Patient/Family Anticipates Transition to  home with family    Patient/Family Anticipated Services at Transition  ;rehabilitation services    Transportation Anticipated  family or friend will provide       Discharge Needs Assessment    Equipment Currently Used at Home  other (see comments) Has a rolling walker from a previous admission    Concerns to be Addressed  discharge planning    Anticipated Changes Related to Illness  none    Equipment Needed After Discharge  commode    Current Discharge Risk  lives alone        Discharge Plan     Row Name 03/03/21 0900       Plan    Plan  home with home health    Plan Comments  CM spoke with patient at bedside. Patient resides alone in [facility]. Patient is independent with ADL's PTA. Patient has a rolling walker from a previous admission that she has been using at home. Patient denies any current home health or outpatient services. Patient has requested a bedside commode and home health with [facility] at discharge. YONI provided a bedside commode from [facility]. CM called and spoke with Cachorro with [facility] home health. She will review. Patient states her son will provide transportation to home. Patient states her family lives three hours away,  however, her daughter will be able to stay with her for a few days after discharge. CM will continue to follow.    Final Discharge Disposition Code  06 - home with home health care        Continued Care and Services - Admitted Since 3/2/2021     Durable Medical Equipment Coordination complete    Service Provider Request Status Selected Services Address Phone Fax Patient Preferred    [facility]   Selected Durable Medical Equipment [address] [id number] [phone] --            Selected Continued Care - Prior Encounters Includes selections from prior encounters from 12/2/2020 to 3/3/2021    Discharged on 3/1/2021 Admission date: 2/27/2021 - Discharge disposition: Home or Self Care    Durable Medical Equipment     Service Provider Selected Services Address Phone Fax Patient Preferred    [facility]  Durable Medical Equipment [address] [id number] [phone] --                    Expected Discharge Date and Time     Expected Discharge Date Expected Discharge Time    Mar 5, 2021         Demographic Summary     Row Name 03/03/21 0841       General Information    Arrived From  emergency department    Referral Source  physician    Reason for Consult  discharge planning    Preferred Language  English     Used During This Interaction  no    General Information Comments  PCP: Estela Garrett       Contact Information    Contact Information Comments  leighton gomes - Relation: Son - Home: [phone]        Functional Status     Row Name 03/03/21 0850       Functional Status    Usual Activity Tolerance  fair    Current Activity Tolerance  poor       Functional Status, IADL    Medications  independent    Meal Preparation  independent    Housekeeping  independent    Laundry  independent    Shopping  independent       Mental Status    General Appearance WDL  WDL       Mental Status Summary    Recent  Changes in Mental Status/Cognitive Functioning  no changes       Employment/    Employment Status  unemployed                Lexi Monson, RN

## 2021-03-03 NOTE — PLAN OF CARE
Goal Outcome Evaluation:  Plan of Care Reviewed With: patient  Progress: improving   Pt has complained of numbness and tingling in the RLE, pt requires assistance of 2 to get up to the BSC, pt states she does not want any narcotics at this time

## 2021-03-03 NOTE — PLAN OF CARE
"Goal Outcome Evaluation:   Patient alert and oriented. VSS. Tingling in RLE, denies numbness. Very weak dorsi and plantarflexion. Educated patient on frequently attempting dorsiflexion and plantarflexion. Patient requesting multiple family members at bedside, crying for her mom to \"have a talk with us\" and denying she knew about our visitation policy. Charge nurses requested and educated patient at bedside. Compromises were made so she could see each family member for a short time. PRN oral pain medication administered. Will continue to monitor.         "

## 2021-03-03 NOTE — PROGRESS NOTES
HOD# : 1    No events last night      Spondylolisthesis of lumbar region    Anxiety and depression    Herpes    Class 1 obesity due to excess calories with serious comorbidity and body mass index (BMI) of 30.0 to 30.9 in adult    Tobacco abuse    Lumbar spinal stenosis      Temp:  [97 °F (36.1 °C)-98.9 °F (37.2 °C)] 98.9 °F (37.2 °C)  Heart Rate:  [65-88] 74  Resp:  [16-24] 16  BP: ()/(60-93) 124/81  I/O last 3 completed shifts:  In: 1100 [I.V.:1000; IV Piggyback:100]  Out: 1400 [Urine:1400]  I/O this shift:  In: -   Out: 400 [Urine:400]  Vital signs were reviewed and documented in the chart      EXAM   Patient appeared in good neurologic function with normal comprehension   CN grossly intact  Wiggles toes on the right can plantar flex much better than yesterday dorsiflexor trace 1 out of 5 EHL, DP still very weak    Quadricep very strong today was able to ambulate pain improved    A/P    Doing well   Right-sided foot drop history left-sided foot drop    She is very pleased with her surgical result took no pain medicine last night leg pain is gone foot weakness better still with some proximal hip issues will follow    Wishes to go home with no pain medicines-

## 2021-03-03 NOTE — PROGRESS NOTES
Continued Stay Note  [facility]     Patient Name: Shahana Cristobal  MRN: [id number]  Today's Date: 3/3/2021    Admit Date: 3/2/2021    Discharge Plan     Row Name 03/03/21 1156       Plan    Plan  [facility] rehab hospital    Plan Comments  Per physical therapy, they are recommending IP rehab @ discharge. CM spoke with patient at bedside. Patient in agreement and would like a referral to [facility]. CM called and spoke with April with [facility]. She will follow. CM will continue to follow for any discharge planning needs.    Final Discharge Disposition Code  62 - inpatient rehab facility            Expected Discharge Date and Time     Expected Discharge Date Expected Discharge Time    Mar 3, 2021             Lexi Monson RN

## 2021-03-03 NOTE — PLAN OF CARE
Goal Outcome Evaluation:  Plan of Care Reviewed With: patient     Outcome Summary: Patient presents with resolution of R sciatic leg pain. She has difficulty activating her R quads, R tib ant and toes.  She was able to ambulate in hallway with knee immobilizer on for quad stability and walker 80 feet slowly . After walking her quad control showed improvement. I would like to progress off the brace and assess for the need for an AFO. If her mobility does not improve she may need inpatient rehab. I recommed continued skilled PT at this point.

## 2021-03-03 NOTE — PLAN OF CARE
Goal Outcome Evaluation:     Progress: improving  Outcome Summary: Pt VSS. A&O x4. RA. No complaints of pain. Significant right foot drop noted. Pt is very unsteady when ambulating due to this. Pt states this is new since surgery. RA. Non-tele. Slept most of night. Will continue to monitor.

## 2021-03-04 ENCOUNTER — APPOINTMENT (OUTPATIENT)
Dept: MRI IMAGING | Facility: HOSPITAL | Age: 51
End: 2021-03-04

## 2021-03-04 LAB
CYTO UR: NORMAL
LAB AP CASE REPORT: NORMAL
LAB AP CLINICAL INFORMATION: NORMAL
PATH REPORT.FINAL DX SPEC: NORMAL
PATH REPORT.GROSS SPEC: NORMAL

## 2021-03-04 PROCEDURE — 97110 THERAPEUTIC EXERCISES: CPT

## 2021-03-04 PROCEDURE — 73721 MRI JNT OF LWR EXTRE W/O DYE: CPT

## 2021-03-04 PROCEDURE — 99231 SBSQ HOSP IP/OBS SF/LOW 25: CPT | Performed by: NURSE PRACTITIONER

## 2021-03-04 PROCEDURE — 63710000001 ONDANSETRON PER 8 MG: Performed by: PHYSICIAN ASSISTANT

## 2021-03-04 PROCEDURE — 99024 POSTOP FOLLOW-UP VISIT: CPT | Performed by: PHYSICIAN ASSISTANT

## 2021-03-04 PROCEDURE — 25010000002 HYDROMORPHONE PER 4 MG: Performed by: PHYSICIAN ASSISTANT

## 2021-03-04 PROCEDURE — 99221 1ST HOSP IP/OBS SF/LOW 40: CPT | Performed by: ORTHOPAEDIC SURGERY

## 2021-03-04 RX ORDER — ACETAMINOPHEN 500 MG
1000 TABLET ORAL EVERY 8 HOURS PRN
Status: DISCONTINUED | OUTPATIENT
Start: 2021-03-04 | End: 2021-03-04

## 2021-03-04 RX ORDER — PANTOPRAZOLE SODIUM 40 MG/1
40 TABLET, DELAYED RELEASE ORAL
Status: DISCONTINUED | OUTPATIENT
Start: 2021-03-04 | End: 2021-03-05

## 2021-03-04 RX ORDER — MELOXICAM 7.5 MG/1
15 TABLET ORAL DAILY
Status: DISCONTINUED | OUTPATIENT
Start: 2021-03-04 | End: 2021-03-05

## 2021-03-04 RX ORDER — ACETAMINOPHEN 500 MG
1000 TABLET ORAL EVERY 8 HOURS
Status: DISCONTINUED | OUTPATIENT
Start: 2021-03-04 | End: 2021-03-05 | Stop reason: ALTCHOICE

## 2021-03-04 RX ORDER — KETOROLAC TROMETHAMINE 30 MG/ML
30 INJECTION, SOLUTION INTRAMUSCULAR; INTRAVENOUS ONCE
Status: DISCONTINUED | OUTPATIENT
Start: 2021-03-04 | End: 2021-03-04

## 2021-03-04 RX ADMIN — ACETAMINOPHEN 1000 MG: 500 TABLET ORAL at 23:36

## 2021-03-04 RX ADMIN — DOCUSATE SODIUM 100 MG: 100 CAPSULE, LIQUID FILLED ORAL at 08:28

## 2021-03-04 RX ADMIN — ROPINIROLE HYDROCHLORIDE 2 MG: 2 TABLET, FILM COATED ORAL at 20:23

## 2021-03-04 RX ADMIN — VALACYCLOVIR HYDROCHLORIDE 1000 MG: 500 TABLET, FILM COATED ORAL at 08:29

## 2021-03-04 RX ADMIN — MELOXICAM 15 MG: 7.5 TABLET ORAL at 08:28

## 2021-03-04 RX ADMIN — HYDROMORPHONE HYDROCHLORIDE 0.5 MG: 1 INJECTION, SOLUTION INTRAMUSCULAR; INTRAVENOUS; SUBCUTANEOUS at 17:35

## 2021-03-04 RX ADMIN — METHOCARBAMOL 750 MG: 750 TABLET ORAL at 22:40

## 2021-03-04 RX ADMIN — PANTOPRAZOLE SODIUM 40 MG: 40 TABLET, DELAYED RELEASE ORAL at 14:02

## 2021-03-04 RX ADMIN — HYDROCODONE BITARTRATE AND ACETAMINOPHEN 1 TABLET: 7.5; 325 TABLET ORAL at 16:30

## 2021-03-04 RX ADMIN — GABAPENTIN 300 MG: 300 CAPSULE ORAL at 22:40

## 2021-03-04 RX ADMIN — ACETAMINOPHEN 1000 MG: 500 TABLET ORAL at 08:28

## 2021-03-04 RX ADMIN — HYDROCODONE BITARTRATE AND ACETAMINOPHEN 1 TABLET: 7.5; 325 TABLET ORAL at 10:13

## 2021-03-04 RX ADMIN — VENLAFAXINE HYDROCHLORIDE 150 MG: 75 CAPSULE, EXTENDED RELEASE ORAL at 08:29

## 2021-03-04 RX ADMIN — GABAPENTIN 300 MG: 300 CAPSULE ORAL at 14:02

## 2021-03-04 RX ADMIN — SODIUM CHLORIDE, PRESERVATIVE FREE 3 ML: 5 INJECTION INTRAVENOUS at 20:25

## 2021-03-04 RX ADMIN — HYDROCODONE BITARTRATE AND ACETAMINOPHEN 1 TABLET: 7.5; 325 TABLET ORAL at 23:36

## 2021-03-04 RX ADMIN — SODIUM CHLORIDE, PRESERVATIVE FREE 3 ML: 5 INJECTION INTRAVENOUS at 08:30

## 2021-03-04 RX ADMIN — ROPINIROLE HYDROCHLORIDE 2 MG: 2 TABLET, FILM COATED ORAL at 08:29

## 2021-03-04 RX ADMIN — GABAPENTIN 300 MG: 300 CAPSULE ORAL at 05:11

## 2021-03-04 RX ADMIN — METHOCARBAMOL 750 MG: 750 TABLET ORAL at 05:11

## 2021-03-04 RX ADMIN — ONDANSETRON HYDROCHLORIDE 4 MG: 4 TABLET, FILM COATED ORAL at 14:22

## 2021-03-04 RX ADMIN — METHOCARBAMOL 750 MG: 750 TABLET ORAL at 14:02

## 2021-03-04 NOTE — PLAN OF CARE
Goal Outcome Evaluation:  Plan of Care Reviewed With: patient  Progress: no change   Pt has had increased pain in her right hip/pelvic area and MRI was ordered, Dr Negrete assessed pt and is ordering a fluro guided injection in the right hip for 3/5, pt is encouraged to use pain medication that is ordered per CHANNING HINTON

## 2021-03-04 NOTE — PROGRESS NOTES
Continued Stay Note  [facility]     Patient Name: Shahana Cristobal  MRN: [id number]  Today's Date: 3/4/2021    Admit Date: 3/2/2021    Discharge Plan     Row Name 03/04/21 5498       Plan    Plan Comments  SW received consult for minimal social structure. SW spoke with pt at bedside. Pt reports that her family just lives far away and so she doesn't have someone to stay with her right now and she feels she needs rehab. Pt reports she does have family and friend support but feels with everything going on right now with her physically, she will require rehab. Referral has been made to [facility]. Pt denies any other social or community resource needs at this time.                                   Discharge Codes    No documentation.       Expected Discharge Date and Time     Expected Discharge Date Expected Discharge Time    Mar 5, 2021             SARAH Jefferson
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
## 2021-03-04 NOTE — PROGRESS NOTES
Continued Stay Note  [facility]     Patient Name: Shahana Cristobal  MRN: [id number]  Today's Date: 3/4/2021    Admit Date: 3/2/2021    Discharge Plan     Row Name 03/04/21 1205       Plan    Plan  Rehab    Patient/Family in Agreement with Plan  yes    Plan Comments  Called April to f/u on referral. Consult noted for minimal social structure and notified RONEN Guerrero. Ortho consulted today.         Discharge Codes    No documentation.       Expected Discharge Date and Time     Expected Discharge Date Expected Discharge Time    Mar 5, 2021             Natalie Ward RN

## 2021-03-04 NOTE — PLAN OF CARE
Problem: Adult Inpatient Plan of Care  Goal: Plan of Care Review  Recent Flowsheet Documentation  Taken 3/4/2021 1419 by Sigrid Persaud, PT  Progress: no change  Plan of Care Reviewed With: patient  Outcome Summary: ambulation deferred d/t significant R anterior hip pain. AAROM performed on RLE and AROM performed on LLE in sitting and supine/sidelying. Pt educated on hip positioning and safety, as she is concerned about recently found labral tear. Pt with improved RLE DF, but still limited. Continue to progress as able. Pt limited by pain, generalized weakness, and decreased functional endurance. D/c rec for IRF remains approrpiate at this time.
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
## 2021-03-04 NOTE — PLAN OF CARE
Goal Outcome Evaluation:     Progress: improving  Outcome Summary: VSS. A&O x4. No complaints of pain. Right foot drop significantly improved from previous night. Pt still requires assistance ambulating. Voiding well to bedside commode. Slept well. Will continue to monitor.

## 2021-03-05 ENCOUNTER — APPOINTMENT (OUTPATIENT)
Dept: GENERAL RADIOLOGY | Facility: HOSPITAL | Age: 51
End: 2021-03-05

## 2021-03-05 PROCEDURE — 3E0U3BZ INTRODUCTION OF ANESTHETIC AGENT INTO JOINTS, PERCUTANEOUS APPROACH: ICD-10-PCS | Performed by: RADIOLOGY

## 2021-03-05 PROCEDURE — 97116 GAIT TRAINING THERAPY: CPT

## 2021-03-05 PROCEDURE — 25010000002 HYDROMORPHONE PER 4 MG: Performed by: PHYSICIAN ASSISTANT

## 2021-03-05 PROCEDURE — 25010000003 LIDOCAINE 1 % SOLUTION: Performed by: PHYSICIAN ASSISTANT

## 2021-03-05 PROCEDURE — 99231 SBSQ HOSP IP/OBS SF/LOW 25: CPT | Performed by: NURSE PRACTITIONER

## 2021-03-05 PROCEDURE — 97530 THERAPEUTIC ACTIVITIES: CPT

## 2021-03-05 PROCEDURE — 97165 OT EVAL LOW COMPLEX 30 MIN: CPT

## 2021-03-05 PROCEDURE — 99024 POSTOP FOLLOW-UP VISIT: CPT | Performed by: NEUROLOGICAL SURGERY

## 2021-03-05 PROCEDURE — 97535 SELF CARE MNGMENT TRAINING: CPT

## 2021-03-05 PROCEDURE — 97110 THERAPEUTIC EXERCISES: CPT

## 2021-03-05 PROCEDURE — 25010000002 IOPAMIDOL 61 % SOLUTION: Performed by: ORTHOPAEDIC SURGERY

## 2021-03-05 PROCEDURE — 3E0U33Z INTRODUCTION OF ANTI-INFLAMMATORY INTO JOINTS, PERCUTANEOUS APPROACH: ICD-10-PCS | Performed by: RADIOLOGY

## 2021-03-05 PROCEDURE — 77002 NEEDLE LOCALIZATION BY XRAY: CPT

## 2021-03-05 RX ORDER — KETOROLAC TROMETHAMINE 30 MG/ML
15 INJECTION, SOLUTION INTRAMUSCULAR; INTRAVENOUS ONCE
Status: DISCONTINUED | OUTPATIENT
Start: 2021-03-05 | End: 2021-03-05

## 2021-03-05 RX ORDER — HYDROCODONE BITARTRATE AND ACETAMINOPHEN 7.5; 325 MG/1; MG/1
1 TABLET ORAL EVERY 6 HOURS PRN
Status: DISCONTINUED | OUTPATIENT
Start: 2021-03-05 | End: 2021-03-08 | Stop reason: HOSPADM

## 2021-03-05 RX ORDER — POLYETHYLENE GLYCOL 3350 17 G/17G
17 POWDER, FOR SOLUTION ORAL DAILY
Status: DISCONTINUED | OUTPATIENT
Start: 2021-03-05 | End: 2021-03-08 | Stop reason: HOSPADM

## 2021-03-05 RX ORDER — DIAZEPAM 5 MG/1
5 TABLET ORAL EVERY 8 HOURS PRN
Qty: 30 TABLET | Refills: 0 | Status: SHIPPED | OUTPATIENT
Start: 2021-03-05

## 2021-03-05 RX ORDER — ACETAMINOPHEN 500 MG
1000 TABLET ORAL EVERY 8 HOURS
Status: DISCONTINUED | OUTPATIENT
Start: 2021-03-05 | End: 2021-03-08 | Stop reason: HOSPADM

## 2021-03-05 RX ORDER — IBUPROFEN 800 MG/1
800 TABLET ORAL EVERY 8 HOURS SCHEDULED
Status: DISCONTINUED | OUTPATIENT
Start: 2021-03-05 | End: 2021-03-08 | Stop reason: HOSPADM

## 2021-03-05 RX ORDER — LIDOCAINE HYDROCHLORIDE 10 MG/ML
5 INJECTION, SOLUTION INFILTRATION; PERINEURAL ONCE
Status: COMPLETED | OUTPATIENT
Start: 2021-03-05 | End: 2021-03-05

## 2021-03-05 RX ORDER — FAMOTIDINE 20 MG/1
20 TABLET, FILM COATED ORAL
Status: DISCONTINUED | OUTPATIENT
Start: 2021-03-05 | End: 2021-03-08 | Stop reason: HOSPADM

## 2021-03-05 RX ORDER — METHYLPREDNISOLONE ACETATE 40 MG/ML
80 INJECTION, SUSPENSION INTRA-ARTICULAR; INTRALESIONAL; INTRAMUSCULAR; SOFT TISSUE ONCE
Status: DISCONTINUED | OUTPATIENT
Start: 2021-03-05 | End: 2021-03-08 | Stop reason: HOSPADM

## 2021-03-05 RX ORDER — PREGABALIN 75 MG/1
75 CAPSULE ORAL EVERY 12 HOURS SCHEDULED
Status: DISCONTINUED | OUTPATIENT
Start: 2021-03-05 | End: 2021-03-08 | Stop reason: HOSPADM

## 2021-03-05 RX ORDER — DIAZEPAM 5 MG/1
5 TABLET ORAL EVERY 6 HOURS PRN
Status: DISCONTINUED | OUTPATIENT
Start: 2021-03-05 | End: 2021-03-08 | Stop reason: HOSPADM

## 2021-03-05 RX ORDER — HYDROCODONE BITARTRATE AND ACETAMINOPHEN 7.5; 325 MG/1; MG/1
1 TABLET ORAL EVERY 6 HOURS PRN
Qty: 40 TABLET | Refills: 0 | Status: SHIPPED | OUTPATIENT
Start: 2021-03-05 | End: 2021-03-17

## 2021-03-05 RX ORDER — DIAZEPAM 2 MG/1
4 TABLET ORAL EVERY 8 HOURS PRN
Status: DISCONTINUED | OUTPATIENT
Start: 2021-03-05 | End: 2021-03-05

## 2021-03-05 RX ORDER — LIDOCAINE HYDROCHLORIDE 10 MG/ML
5 INJECTION, SOLUTION EPIDURAL; INFILTRATION; INTRACAUDAL; PERINEURAL ONCE
Status: DISCONTINUED | OUTPATIENT
Start: 2021-03-05 | End: 2021-03-08 | Stop reason: HOSPADM

## 2021-03-05 RX ADMIN — SODIUM CHLORIDE, PRESERVATIVE FREE 3 ML: 5 INJECTION INTRAVENOUS at 20:11

## 2021-03-05 RX ADMIN — ROPINIROLE HYDROCHLORIDE 2 MG: 2 TABLET, FILM COATED ORAL at 20:09

## 2021-03-05 RX ADMIN — VALACYCLOVIR HYDROCHLORIDE 1000 MG: 500 TABLET, FILM COATED ORAL at 08:01

## 2021-03-05 RX ADMIN — PANTOPRAZOLE SODIUM 40 MG: 40 TABLET, DELAYED RELEASE ORAL at 05:27

## 2021-03-05 RX ADMIN — FAMOTIDINE 20 MG: 20 TABLET, FILM COATED ORAL at 17:30

## 2021-03-05 RX ADMIN — VENLAFAXINE HYDROCHLORIDE 150 MG: 75 CAPSULE, EXTENDED RELEASE ORAL at 08:00

## 2021-03-05 RX ADMIN — PREGABALIN 75 MG: 75 CAPSULE ORAL at 20:10

## 2021-03-05 RX ADMIN — HYDROMORPHONE HYDROCHLORIDE 0.5 MG: 1 INJECTION, SOLUTION INTRAMUSCULAR; INTRAVENOUS; SUBCUTANEOUS at 09:16

## 2021-03-05 RX ADMIN — SODIUM CHLORIDE, PRESERVATIVE FREE 3 ML: 5 INJECTION INTRAVENOUS at 08:01

## 2021-03-05 RX ADMIN — IBUPROFEN 800 MG: 800 TABLET, FILM COATED ORAL at 14:57

## 2021-03-05 RX ADMIN — IOPAMIDOL 10 ML: 612 INJECTION, SOLUTION INTRAVENOUS at 14:57

## 2021-03-05 RX ADMIN — METHOCARBAMOL 750 MG: 750 TABLET ORAL at 05:27

## 2021-03-05 RX ADMIN — PREGABALIN 75 MG: 75 CAPSULE ORAL at 11:30

## 2021-03-05 RX ADMIN — HYDROCODONE BITARTRATE AND ACETAMINOPHEN 1 TABLET: 7.5; 325 TABLET ORAL at 07:59

## 2021-03-05 RX ADMIN — DIAZEPAM 5 MG: 5 TABLET ORAL at 14:57

## 2021-03-05 RX ADMIN — HYDROCODONE BITARTRATE AND ACETAMINOPHEN 1 TABLET: 7.5; 325 TABLET ORAL at 20:10

## 2021-03-05 RX ADMIN — HYDROCODONE BITARTRATE AND ACETAMINOPHEN 1 TABLET: 7.5; 325 TABLET ORAL at 14:57

## 2021-03-05 RX ADMIN — ROPINIROLE HYDROCHLORIDE 2 MG: 2 TABLET, FILM COATED ORAL at 08:01

## 2021-03-05 RX ADMIN — HYDROMORPHONE HYDROCHLORIDE 0.5 MG: 1 INJECTION, SOLUTION INTRAMUSCULAR; INTRAVENOUS; SUBCUTANEOUS at 00:48

## 2021-03-05 RX ADMIN — LIDOCAINE HYDROCHLORIDE 5 ML: 10 INJECTION, SOLUTION INFILTRATION; PERINEURAL at 14:55

## 2021-03-05 RX ADMIN — GABAPENTIN 300 MG: 300 CAPSULE ORAL at 05:27
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
## 2021-03-05 NOTE — PLAN OF CARE
Goal Outcome Evaluation:  Plan of Care Reviewed With: patient  Progress: improving  Outcome Summary: Patient demo. good effort w/ mobility, although limited by weakness and pain.  She transferred STS 3x from chair w/ CGA and ambulated 15ft w/ RW and min  A x 2.  Gait distance limited by fatigue, dec motor control RLE.  Pt. participated in BLE TherEx w/ education to continue (RLE) isometric ex. on her own throughout the day.  PT continues to recommend IP rehab at [facility] for best outcome.

## 2021-03-05 NOTE — PLAN OF CARE
Goal Outcome Evaluation:  Plan of Care Reviewed With: patient  Progress: improving  Pt. Has complained of moderate to severe pain, dilaudid given once, norco given q 6 hours. VSS, on room air. Had a CT guided steroid shot in her right hip. VSS, on room air. Up to BSC with one assist. Voiding well. Awaiting rehab placement. Will continue to monitor.
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
## 2021-03-05 NOTE — ANESTHESIA POSTPROCEDURE EVALUATION
Patient: Shahana Cristobal    Procedure Summary     Date: 03/02/21 [facility]    Anesthesia Start: 1411 Anesthesia Stop: 1641    Procedure: LUMBAR DISCECTOMY MICRO ENDOSCOPIC (Right Spine Lumbar) Diagnosis:       Spondylolisthesis of lumbar region      (Spondylolisthesis of lumbar region [M43.16])    Surgeon: Nicolas Jefferson MD Provider: Danial Boykin MD    Anesthesia Type: general ASA Status: 3 - Emergent          Anesthesia Type: general    Vitals  Vitals Value Taken Time   /79 03/02/21 1715   Temp 97.5 °F (36.4 °C) 03/02/21 1715   Pulse 72 03/02/21 1722   Resp 20 03/02/21 1715   SpO2 96 % 03/02/21 1722   Vitals shown include unvalidated device data.        Post Anesthesia Care and Evaluation    Patient location during evaluation: PACU  Patient participation: complete - patient participated  Level of consciousness: awake and alert  Pain management: adequate  Airway patency: patent  Anesthetic complications: No anesthetic complications  PONV Status: none  Cardiovascular status: hemodynamically stable and acceptable  Respiratory status: nonlabored ventilation, acceptable and nasal cannula  Hydration status: acceptable

## 2021-03-05 NOTE — PROGRESS NOTES
"HOD# : 3    No events last night      Spondylolisthesis of lumbar region    Anxiety and depression    Herpes    Class 1 obesity due to excess calories with serious comorbidity and body mass index (BMI) of 30.0 to 30.9 in adult    Tobacco abuse    Lumbar spinal stenosis      Temp:  [98.3 °F (36.8 °C)-98.8 °F (37.1 °C)] 98.3 °F (36.8 °C)  Heart Rate:  [82-91] 82  Resp:  [18] 18  BP: (121-149)/(60-91) 140/80  I/O last 3 completed shifts:  In: -   Out: 4950 [Urine:4950]  I/O this shift:  In: -   Out: 700 [Urine:700]  Vital signs were reviewed and documented in the chart      EXAM   Patient appeared in good neurologic function with normal comprehension   CN grossly intact  Moves all extremities to command  Wiggles toes on the right    We will clearly fire quadricep on the right has an intact L4 distribution reflex that is brisk she has no clonus long track signs  strength is good can dorsiflex still with a lot of pain internal and external rotation      Assessment plan    1.  Status post right-sided L4-5 very large synovial cyst chronic spondylolisthesis disc bulging L4-5 L5-S1 no evidence of critical canal stenosis status post resection lots of inflammation on pathology nerve root was clearly free at resolution the case but suffered from severe compression patient still reporting marked resolution of severe weakness and pain in the right lower extremity \"nerve waking up \"stated this happened last time that she was \"paralyzed \".  Effort dependent right quadricep weakness none consistent with examination her location has intact quadriceps distribution reflex and this would be unexpected in the distribution of the L5 distribution nerve given its location L4 was not affected    2.  Right-sided intrinsic hip dysfunction-discussed case with Dr. Negrete-would appreciate an injection in the hip to see if that would help radial acetabular tear etc.    3.  Discussed with her and her mother yesterday and today complex social " situation lot of anxiety issues, dependency issues with marijuana use but some coping strategies optimistic about outcome    I told her this is can be a long-term plan she is not a candidate for fusion at present-there should be no danger to go home as long as she can have some assistance of home health PT etc. but also open to rehab she wishes to go home    We will adjust medicines today Lyrica 75 twice daily she understands she has a drug use history the habituating nature that    We will try some Valium for muscle spasm it will also help with some of the anxiety is a short-term medication I discussed dependency issues with that but she is clearly uncomfortable 5 mg as needed muscle spasm    DC ketorolac given stomach upset no more steroids can exacerbate anxiety etc.    Scheduled ibuprofen Tylenol with as needed 7.5 Lortab    Will reassess later depending on PT OT will do home safety evaluation from my standpoint can go home appreciate input of other providers
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
## 2021-03-05 NOTE — PLAN OF CARE
Patient will not be able to go home due to insurance not covering home health.  Patient wishes to go home however is not going to be able to drive/attend therapy due to right foot drop.    Patient doing much better today after starting Valium, Lyrica, Pepcid, and Voltaren.    Patient went about 1:00 for the CT-guided hip injection.  Patient much more stable today.  Based off my conversation with the  [facility] will see her and evaluate her and will likely get her transferred until Monday.

## 2021-03-05 NOTE — PLAN OF CARE
Goal Outcome Evaluation:  Plan of Care Reviewed With: patient  Progress: improving  Outcome Summary: Pt. supv for supine to sit EOB. Pt txfr to chair with vc's, min assist, RW. Pt performed squat pivot to BSC with supv, and is min assist for sit to stand. Pt completed functional mobility with min x 2 with RW. Provided AE , pt donned pants with CGA. pt would benefit from IRF, but pt has not made decision. If she goes home she will need 24 hr assist and home health.

## 2021-03-05 NOTE — PROGRESS NOTES
Continued Stay Note  [facility]     Patient Name: Shahana Cristobal  MRN: [id number]  Today's Date: 3/5/2021    Admit Date: 3/2/2021    Discharge Plan     Row Name 03/05/21 1240       Plan    Plan  Rehab    Patient/Family in Agreement with Plan  yes    Plan Comments  Spoke to pt at  and she is getting an injection later this afternoon and wanted to go home with . Discussed that it may be difficult to find  that accepts [place]. Pt has now opted to go to rehab. Called April at [facility] and she will submit precert for approval at [facility]. Pt's son can transport if a bed is available today, but pt will likely need transport. CM will follow.Natalie ext. [phone]        Discharge Codes    No documentation.       Expected Discharge Date and Time     Expected Discharge Date Expected Discharge Time    Mar 8, 2021             Natalie Ward RN

## 2021-03-06 PROCEDURE — 97116 GAIT TRAINING THERAPY: CPT

## 2021-03-06 PROCEDURE — 99024 POSTOP FOLLOW-UP VISIT: CPT | Performed by: NEUROLOGICAL SURGERY

## 2021-03-06 PROCEDURE — 99232 SBSQ HOSP IP/OBS MODERATE 35: CPT | Performed by: NURSE PRACTITIONER

## 2021-03-06 RX ADMIN — ROPINIROLE HYDROCHLORIDE 2 MG: 2 TABLET, FILM COATED ORAL at 08:50

## 2021-03-06 RX ADMIN — ACETAMINOPHEN 1000 MG: 500 TABLET ORAL at 00:46

## 2021-03-06 RX ADMIN — HYDROCODONE BITARTRATE AND ACETAMINOPHEN 1 TABLET: 7.5; 325 TABLET ORAL at 16:40

## 2021-03-06 RX ADMIN — HYDROCODONE BITARTRATE AND ACETAMINOPHEN 1 TABLET: 7.5; 325 TABLET ORAL at 08:50

## 2021-03-06 RX ADMIN — FAMOTIDINE 20 MG: 20 TABLET, FILM COATED ORAL at 08:50

## 2021-03-06 RX ADMIN — VENLAFAXINE HYDROCHLORIDE 150 MG: 75 CAPSULE, EXTENDED RELEASE ORAL at 08:49

## 2021-03-06 RX ADMIN — DIAZEPAM 5 MG: 5 TABLET ORAL at 23:22

## 2021-03-06 RX ADMIN — FAMOTIDINE 20 MG: 20 TABLET, FILM COATED ORAL at 18:00

## 2021-03-06 RX ADMIN — VALACYCLOVIR HYDROCHLORIDE 1000 MG: 500 TABLET, FILM COATED ORAL at 08:49

## 2021-03-06 RX ADMIN — HYDROCODONE BITARTRATE AND ACETAMINOPHEN 1 TABLET: 7.5; 325 TABLET ORAL at 01:16

## 2021-03-06 RX ADMIN — HYDROCODONE BITARTRATE AND ACETAMINOPHEN 1 TABLET: 7.5; 325 TABLET ORAL at 23:20

## 2021-03-06 RX ADMIN — IBUPROFEN 800 MG: 800 TABLET, FILM COATED ORAL at 05:40

## 2021-03-06 RX ADMIN — PREGABALIN 75 MG: 75 CAPSULE ORAL at 08:49

## 2021-03-06 RX ADMIN — PREGABALIN 75 MG: 75 CAPSULE ORAL at 21:15

## 2021-03-06 RX ADMIN — DIAZEPAM 5 MG: 5 TABLET ORAL at 14:04

## 2021-03-06 RX ADMIN — ROPINIROLE HYDROCHLORIDE 2 MG: 2 TABLET, FILM COATED ORAL at 21:15

## 2021-03-06 RX ADMIN — SODIUM CHLORIDE, PRESERVATIVE FREE 3 ML: 5 INJECTION INTRAVENOUS at 08:50

## 2021-03-06 NOTE — PLAN OF CARE
Goal Outcome Evaluation:  Plan of Care Reviewed With: patient  Progress: improving  Outcome Summary: Pt found ambulating in [place] independently with RW and very unsafe and ataxic appearance approx. 30 feet out of her room. No KI on. Therapist took over with gait belt and min A for safety. Pt impulsive and unsafe until cued to slow down and take her time with edu for safety awareness. Ambulated 175 feet total with RW and min A with chair follow. Pt acknowledged but loses focus easily.  Recommend IRF at d/c.

## 2021-03-06 NOTE — NURSING NOTE
Pt AOx4 and VSS.  Pt slept between care.  Pt pain was controlled with PO pain meds.  Pt voiding spontaneously via BSC.  Continuing to monitor.
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
## 2021-03-06 NOTE — PROGRESS NOTES
"NEUROSURGERY PROGRESS NOTE     LOS: 4 days   Patient Care Team:  Estela Garrett MD as PCP - General (Internal Medicine)  Stephen Resendiz MD as Obstetrician (Obstetrics and Gynecology)  Kingsley Dawson MD as Consulting Physician (Neurosurgery)  Ninfa Lucero RN as Ambulatory  ([facility])    Chief Complaint: Right lower extremity pain.    POD#: 4 Days Post-Op  Procedures:  Right L5 hemilaminectomy with L4-5 foraminotomy and synovial cyst resection.    Interval History:   Patient Complaints: \"Nerves in right leg waking up.\"  Patient Denies: New weakness or numbness.    Vital Signs: Blood pressure 127/78, pulse 80, temperature 98.2 °F (36.8 °C), temperature source Oral, resp. rate 16, height 167.6 cm (66\"), weight 94.3 kg (208 lb), SpO2 97 %, not currently breastfeeding.  Intake/Output:     Intake/Output Summary (Last 24 hours) at 3/6/2021 0738  Last data filed at 3/5/2021 1700  Gross per 24 hour   Intake 1960 ml   Output 1750 ml   Net 210 ml       Physical Exam:  The patient is awake and alert.  She is in good spirits.  Dry dressing is in place on her incision.  Motor function is significantly improving in her right leg.  Dorsiflexion of her right foot is 4/5.  Right leg extension is 4/5.  Right hip flexors are at least 3/5.     Assessment/Plan:  1.  Right L4-5 synovial cyst and stenosis status post decompression.  2.  Right hip labrum tear status post injection.  3.  Anxiety and depression.  4.  Disposition: Mobilize patient.  Patient wants to go home.  Continue to mobilize.  If her pain is controlled and she is ambulating independently then we will discharge her home tomorrow.    Simeon Varela MD  03/06/21  07:38 EST    "

## 2021-03-06 NOTE — PROGRESS NOTES
[facility] Medicine Services  PROGRESS NOTE    Patient Name: Shahana Cristobal  : 1970  MRN: [id number]    Date of Admission: 3/2/2021  Primary Care Physician: Estela Garrett MD    Subjective   Subjective     CC:  Medical management s/p L4-5 discectomy    HPI:  Patient seen resting in bed in no apparent distress.  No acute events overnight per nursing.  She reports her pain is better controlled today.  She is hopeful that she will continue to improve and be able to ambulate independently.  She states that she may get to go home tomorrow.  She expressed no new concerns at this time.    ROS:  Gen- No fevers, chills  CV- No chest pain, palpitations  Resp- No cough, dyspnea  GI- No N/V/D, abd pain  MSK - + right groin pain     Objective   Objective     Vital Signs:   Temp:  [98.2 °F (36.8 °C)] 98.2 °F (36.8 °C)  Heart Rate:  [80-86] 80  Resp:  [16-18] 16  BP: (127-134)/(78-97) 127/78        Physical Exam:  Constitutional: No acute distress, awake, alert  HENT: NCAT, mucous membranes moist  Respiratory: Clear to auscultation bilaterally, respiratory effort normal   Cardiovascular: RRR, no murmurs, cap refill brisk   Gastrointestinal: Positive bowel sounds, soft, nontender, nondistended  Musculoskeletal: No BLE edema   Psychiatric: Appropriate affect, cooperative  Neurologic: Oriented x 3, moves all extremities, RLE weakness, speech clear  Skin: warm, dry, no visible rashes     Results Reviewed:  Results from last 7 days   Lab Units 21  0720 21  1258 21  1140 21  0845 21  2209   WBC 10*3/mm3 14.33*  --  14.88* 10.29 12.12*   HEMOGLOBIN g/dL 11.0*  --  12.0 12.1 12.0   HEMATOCRIT % 34.5  --  37.0 38.8 37.5   PLATELETS 10*3/mm3 412  --  451* 393 411   INR   --  0.91  --   --  0.88     Results from last 7 days   Lab Units 21  0720 21  1140 21  0844 21  2209   SODIUM mmol/L 139 141 138 143   POTASSIUM mmol/L 4.4 3.9 4.3 4.0    CHLORIDE mmol/L 103 103 104 106   CO2 mmol/L 28.0 28.0 26.0 26.0   BUN mg/dL 13 24* 20 20   CREATININE mg/dL 0.47* 0.61 0.53* 0.60   GLUCOSE mg/dL 101* 90 123* 100*   CALCIUM mg/dL 8.6 9.0 9.0 9.2   ALT (SGPT) U/L  --  39*  --  32   AST (SGOT) U/L  --  19  --  24     Estimated Creatinine Clearance: 163.9 mL/min (A) (by C-G formula based on SCr of 0.47 mg/dL (L)).    Microbiology Results Abnormal     Procedure Component Value - Date/Time    COVID PRE-OP / PRE-PROCEDURE SCREENING ORDER (NO ISOLATION) - Swab, Nasopharynx [[id number]]  (Normal) Collected: 03/02/21 1252    Lab Status: Final result Specimen: Swab from Nasopharynx Updated: 03/02/21 1331    Narrative:      The following orders were created for panel order COVID PRE-OP / PRE-PROCEDURE SCREENING ORDER (NO ISOLATION) - Swab, Nasopharynx.  Procedure                               Abnormality         Status                     ---------                               -----------         ------                     COVID-19, ABBOTT IN-HOUS...[[id number]]  Normal              Final result                 Please view results for these tests on the individual orders.    COVID-19, ABBOTT IN-HOUSE,NASAL Swab (NO TRANSPORT MEDIA) 2 HR TAT - Swab, Nasopharynx [[id number]]  (Normal) Collected: 03/02/21 1252    Lab Status: Final result Specimen: Swab from Nasopharynx Updated: 03/02/21 1331     COVID19 Presumptive Negative    Narrative:      Fact sheet for providers: https://www.fda.gov/media/494834/download     Fact sheet for patients: https://www.fda.gov/media/944536/download    Test performed by PCR.  If inconsistent with clinical signs and symptoms patient should be tested with different authorized molecular test.          Imaging Results (Last 24 Hours)     Procedure Component Value Units Date/Time    FL Guide For Pain Meds Inj [[id number]] Collected: 03/05/21 1514     Updated: 03/05/21 1605    Narrative:      Exam: Fluoroscopic guided joint injection     INDICATION: Right  hip pain     TECHNIQUE: 6 seconds of fluoroscopic time was used for this procedure. 2  associated images were saved. Procedure, risks, complications, and side  effects of joint injection were discussed and reviewed in detail with  the patient including the possibility for bleeding, infection, and  needle damage to surrounding structures. The patient indicated  understanding and consented to the procedure.     The right hip was prepped and draped in a sterile fashion. 1% lidocaine  was used as a local anesthetic to the skin and subcutaneous tissues.  Under fluoroscopic guidance a 20-gauge needle was advanced to the joint  space. Approximately 1 cc of Isovue-300 contrast media was injected into  the joint space. Next, 2 cc of 40 mg/mL of Depo-Medrol mixed with  approximately 5 cc of 1% lidocaine was injected into the joint space.  The needle was removed.     FINDINGS: The patient tolerated the procedure well, and no immediate  complications occurred.       Impression:      Successful fluoroscopic guided therapeutic right hip joint  injection as above with no immediate complications.     This report was finalized on 3/5/2021 4:02 PM by Robert Serra.                 I have reviewed the medications:  Scheduled Meds:acetaminophen, 1,000 mg, Oral, Q8H  famotidine, 20 mg, Oral, BID AC  ibuprofen, 800 mg, Oral, Q8H  lidocaine PF 1%, 5 mL, Injection, Once  methylPREDNISolone acetate, 80 mg, Intra-articular, Once  polyethylene glycol, 17 g, Oral, Daily  pregabalin, 75 mg, Oral, Q12H  rOPINIRole, 2 mg, Oral, BID  sodium chloride, 3 mL, Intravenous, Q12H  valACYclovir, 1,000 mg, Oral, Daily  venlafaxine XR, 150 mg, Oral, Daily      Continuous Infusions:lactated ringers, 9 mL/hr, Last Rate: 9 mL/hr (03/02/21 3978)      PRN Meds:.albuterol sulfate HFA  •  diazePAM  •  HYDROcodone-acetaminophen  •  [COMPLETED] Insert peripheral IV **AND** sodium chloride    Assessment/Plan   Assessment & Plan     Active Hospital Problems     Diagnosis  POA   • **Spondylolisthesis of lumbar region [M43.16]  Yes   • Lumbar spinal stenosis [M48.061]  Yes   • Tobacco abuse [Z72.0]  Yes   • Class 1 obesity due to excess calories with serious comorbidity and body mass index (BMI) of 30.0 to 30.9 in adult [E66.09, Z68.30]  Not Applicable   • Herpes [B00.9]  No   • Anxiety and depression [F41.9, F32.9]  Yes      Resolved Hospital Problems   No resolved problems to display.        Brief Hospital Course to date:  Shahana Cristobal is a 50 y.o. female with a PMHx significant for lumbar spinal stenosis, HSV, anxiety, depression, obesity, and tobacco abuse who presented to the ED on 3/2/21 with complaints of right leg pain. Dr. Jefferson admitted and performed a right sided L4-5 lamina foraminotomy, right sided resection synovial cyst, and right sided hemilaminectomy L5 cephalad down with generous foraminotomy exiting foramina L5 under the pedicle. Hospital medicine was consulted to assist with medical management.     This patient's problems and plans were partially entered by my partner and updated as appropriate by me 03/06/21.    Spondylolisthesis of lumbar region   Lumbar spinal stenosis  Right foot drop  --S/p L4-5 discectomy   --Pain control; Percocet and Neurontin ordered per Neurosurgery  --Ambulate per neurosurgery recommendations  --PT feels that patient is very unstable to go home by herself even with a rolling walker due to inability to ambulate on her own; recommends inpatient therapy  --WBC 14.33 on 3/3  --AM labs     Right hip labrum tear  --MRI of right hip this morning per neurosurgery shows a right hip labrum tear  --Ortho following  --s/p hip injection under fluro 3/5/21    Anxiety and depression  --Continue home dose of Effexor     HSV  --Continue home dose valacyclovir     Tobacco abuse  --NRT  --Tobacco cessation education  --PRN albuterol     Obesity  --Has lost 118 pounds in the last year (on purpose)     Thank you for allowing [facility]  Medicine Service to provide consultative care for your patient, we will continue to follow while clinically appropriate.    CODE STATUS:   Code Status and Medical Interventions:   Ordered at: 03/02/21 3418     Level Of Support Discussed With:    Patient     Code Status:    CPR     Medical Interventions (Level of Support Prior to Arrest):    Full       Justin Sweeney, KRUPA  03/06/21

## 2021-03-07 LAB
ANION GAP SERPL CALCULATED.3IONS-SCNC: 13 MMOL/L (ref 5–15)
BASOPHILS # BLD AUTO: 0.06 10*3/MM3 (ref 0–0.2)
BASOPHILS NFR BLD AUTO: 0.4 % (ref 0–1.5)
BUN SERPL-MCNC: 18 MG/DL (ref 6–20)
BUN/CREAT SERPL: 33.3 (ref 7–25)
CALCIUM SPEC-SCNC: 9.9 MG/DL (ref 8.6–10.5)
CHLORIDE SERPL-SCNC: 97 MMOL/L (ref 98–107)
CO2 SERPL-SCNC: 27 MMOL/L (ref 22–29)
CREAT SERPL-MCNC: 0.54 MG/DL (ref 0.57–1)
DEPRECATED RDW RBC AUTO: 47.3 FL (ref 37–54)
EOSINOPHIL # BLD AUTO: 0.05 10*3/MM3 (ref 0–0.4)
EOSINOPHIL NFR BLD AUTO: 0.4 % (ref 0.3–6.2)
ERYTHROCYTE [DISTWIDTH] IN BLOOD BY AUTOMATED COUNT: 14.6 % (ref 12.3–15.4)
GFR SERPL CREATININE-BSD FRML MDRD: 119 ML/MIN/1.73
GLUCOSE SERPL-MCNC: 114 MG/DL (ref 65–99)
HCT VFR BLD AUTO: 43.5 % (ref 34–46.6)
HGB BLD-MCNC: 14 G/DL (ref 12–15.9)
IMM GRANULOCYTES # BLD AUTO: 0.18 10*3/MM3 (ref 0–0.05)
IMM GRANULOCYTES NFR BLD AUTO: 1.3 % (ref 0–0.5)
LYMPHOCYTES # BLD AUTO: 2.88 10*3/MM3 (ref 0.7–3.1)
LYMPHOCYTES NFR BLD AUTO: 20.6 % (ref 19.6–45.3)
MAGNESIUM SERPL-MCNC: 2.1 MG/DL (ref 1.6–2.6)
MCH RBC QN AUTO: 28.6 PG (ref 26.6–33)
MCHC RBC AUTO-ENTMCNC: 32.2 G/DL (ref 31.5–35.7)
MCV RBC AUTO: 89 FL (ref 79–97)
MONOCYTES # BLD AUTO: 0.74 10*3/MM3 (ref 0.1–0.9)
MONOCYTES NFR BLD AUTO: 5.3 % (ref 5–12)
NEUTROPHILS NFR BLD AUTO: 10.07 10*3/MM3 (ref 1.7–7)
NEUTROPHILS NFR BLD AUTO: 72 % (ref 42.7–76)
NRBC BLD AUTO-RTO: 0 /100 WBC (ref 0–0.2)
PLATELET # BLD AUTO: 504 10*3/MM3 (ref 140–450)
PMV BLD AUTO: 8.5 FL (ref 6–12)
POTASSIUM SERPL-SCNC: 4.6 MMOL/L (ref 3.5–5.2)
RBC # BLD AUTO: 4.89 10*6/MM3 (ref 3.77–5.28)
SODIUM SERPL-SCNC: 137 MMOL/L (ref 136–145)
WBC # BLD AUTO: 13.98 10*3/MM3 (ref 3.4–10.8)

## 2021-03-07 PROCEDURE — 80048 BASIC METABOLIC PNL TOTAL CA: CPT | Performed by: NURSE PRACTITIONER

## 2021-03-07 PROCEDURE — 99024 POSTOP FOLLOW-UP VISIT: CPT | Performed by: NEUROLOGICAL SURGERY

## 2021-03-07 PROCEDURE — 99232 SBSQ HOSP IP/OBS MODERATE 35: CPT | Performed by: NURSE PRACTITIONER

## 2021-03-07 PROCEDURE — 63710000001 ONDANSETRON PER 8 MG: Performed by: NURSE PRACTITIONER

## 2021-03-07 PROCEDURE — 83735 ASSAY OF MAGNESIUM: CPT | Performed by: NURSE PRACTITIONER

## 2021-03-07 PROCEDURE — 97530 THERAPEUTIC ACTIVITIES: CPT

## 2021-03-07 PROCEDURE — 25010000002 DEXAMETHASONE PER 1 MG: Performed by: NEUROLOGICAL SURGERY

## 2021-03-07 PROCEDURE — 85025 COMPLETE CBC W/AUTO DIFF WBC: CPT | Performed by: NURSE PRACTITIONER

## 2021-03-07 RX ORDER — ONDANSETRON 2 MG/ML
4 INJECTION INTRAMUSCULAR; INTRAVENOUS ONCE
Status: DISCONTINUED | OUTPATIENT
Start: 2021-03-07 | End: 2021-03-08 | Stop reason: HOSPADM

## 2021-03-07 RX ORDER — ONDANSETRON 4 MG/1
4 TABLET, FILM COATED ORAL ONCE
Status: COMPLETED | OUTPATIENT
Start: 2021-03-07 | End: 2021-03-07

## 2021-03-07 RX ORDER — DEXAMETHASONE SODIUM PHOSPHATE 4 MG/ML
4 INJECTION, SOLUTION INTRA-ARTICULAR; INTRALESIONAL; INTRAMUSCULAR; INTRAVENOUS; SOFT TISSUE EVERY 6 HOURS
Status: DISCONTINUED | OUTPATIENT
Start: 2021-03-07 | End: 2021-03-08 | Stop reason: HOSPADM

## 2021-03-07 RX ADMIN — FAMOTIDINE 20 MG: 20 TABLET, FILM COATED ORAL at 17:47

## 2021-03-07 RX ADMIN — DIAZEPAM 5 MG: 5 TABLET ORAL at 23:18

## 2021-03-07 RX ADMIN — DIAZEPAM 5 MG: 5 TABLET ORAL at 16:20

## 2021-03-07 RX ADMIN — IBUPROFEN 800 MG: 800 TABLET, FILM COATED ORAL at 06:28

## 2021-03-07 RX ADMIN — DEXAMETHASONE SODIUM PHOSPHATE 4 MG: 4 INJECTION, SOLUTION INTRA-ARTICULAR; INTRALESIONAL; INTRAMUSCULAR; INTRAVENOUS; SOFT TISSUE at 08:58

## 2021-03-07 RX ADMIN — ROPINIROLE HYDROCHLORIDE 2 MG: 2 TABLET, FILM COATED ORAL at 08:36

## 2021-03-07 RX ADMIN — PREGABALIN 75 MG: 75 CAPSULE ORAL at 08:36

## 2021-03-07 RX ADMIN — DEXAMETHASONE SODIUM PHOSPHATE 4 MG: 4 INJECTION, SOLUTION INTRA-ARTICULAR; INTRALESIONAL; INTRAMUSCULAR; INTRAVENOUS; SOFT TISSUE at 14:15

## 2021-03-07 RX ADMIN — VENLAFAXINE HYDROCHLORIDE 150 MG: 75 CAPSULE, EXTENDED RELEASE ORAL at 08:36

## 2021-03-07 RX ADMIN — HYDROCODONE BITARTRATE AND ACETAMINOPHEN 1 TABLET: 7.5; 325 TABLET ORAL at 23:18

## 2021-03-07 RX ADMIN — FAMOTIDINE 20 MG: 20 TABLET, FILM COATED ORAL at 06:28

## 2021-03-07 RX ADMIN — HYDROCODONE BITARTRATE AND ACETAMINOPHEN 1 TABLET: 7.5; 325 TABLET ORAL at 06:28

## 2021-03-07 RX ADMIN — ONDANSETRON HYDROCHLORIDE 4 MG: 4 TABLET, FILM COATED ORAL at 03:54

## 2021-03-07 RX ADMIN — VALACYCLOVIR HYDROCHLORIDE 1000 MG: 500 TABLET, FILM COATED ORAL at 08:36

## 2021-03-07 RX ADMIN — DEXAMETHASONE SODIUM PHOSPHATE 4 MG: 4 INJECTION, SOLUTION INTRA-ARTICULAR; INTRALESIONAL; INTRAMUSCULAR; INTRAVENOUS; SOFT TISSUE at 20:25

## 2021-03-07 RX ADMIN — ACETAMINOPHEN 1000 MG: 500 TABLET ORAL at 17:47

## 2021-03-07 RX ADMIN — ACETAMINOPHEN 1000 MG: 500 TABLET ORAL at 09:59

## 2021-03-07 RX ADMIN — IBUPROFEN 800 MG: 800 TABLET, FILM COATED ORAL at 21:37

## 2021-03-07 RX ADMIN — ROPINIROLE HYDROCHLORIDE 2 MG: 2 TABLET, FILM COATED ORAL at 20:24

## 2021-03-07 RX ADMIN — IBUPROFEN 800 MG: 800 TABLET, FILM COATED ORAL at 14:12

## 2021-03-07 RX ADMIN — HYDROCODONE BITARTRATE AND ACETAMINOPHEN 1 TABLET: 7.5; 325 TABLET ORAL at 14:12

## 2021-03-07 RX ADMIN — PREGABALIN 75 MG: 75 CAPSULE ORAL at 20:24

## 2021-03-07 RX ADMIN — DIAZEPAM 5 MG: 5 TABLET ORAL at 08:41

## 2021-03-07 NOTE — PROGRESS NOTES
[facility] Medicine Services  PROGRESS NOTE    Patient Name: Shahana Cristobal  : 1970  MRN: [id number]    Date of Admission: 3/2/2021  Primary Care Physician: Estela Garrett MD    Subjective   Subjective     CC:  Medical management s/p L4-5 discectomy    HPI:  Patient seen resting in bed in no apparent distress.  No acute events overnight per nursing.  She reports increased right hip and leg pain this morning.  She is still hopeful to return home and would like to avoid rehab.  She expressed no new concerns at this time.    ROS:  Gen- No fevers, chills  CV- No chest pain, palpitations  Resp- No cough, dyspnea  GI- No N/V/D, abd pain  MSK- +right groin/leg pain     Objective   Objective     Vital Signs:   Temp:  [98.2 °F (36.8 °C)-98.4 °F (36.9 °C)] 98.4 °F (36.9 °C)  Heart Rate:  [80-95] 80  Resp:  [16-18] 16  BP: (134-146)/(77-85) 143/85        Physical Exam:  Constitutional: No acute distress, awake, alert  HENT: NCAT, mucous membranes moist  Respiratory: Clear to auscultation bilaterally, respiratory effort normal   Cardiovascular: RRR, no murmurs, cap refill brisk   Gastrointestinal: Positive bowel sounds, soft, nontender, nondistended  Musculoskeletal: No BLE edema   Psychiatric: Appropriate affect, cooperative  Neurologic: Oriented x 3, moves all extremities, RLE weakness, speech clear  Skin: warm, dry, no visible rash     Results Reviewed:  Results from last 7 days   Lab Units 21  0539 21  0720 21  1258 21  1140   WBC 10*3/mm3 13.98* 14.33*  --  14.88*   HEMOGLOBIN g/dL 14.0 11.0*  --  12.0   HEMATOCRIT % 43.5 34.5  --  37.0   PLATELETS 10*3/mm3 504* 412  --  451*   INR   --   --  0.91  --      Results from last 7 days   Lab Units 21  0539 21  0720 21  1140   SODIUM mmol/L 137 139 141   POTASSIUM mmol/L 4.6 4.4 3.9   CHLORIDE mmol/L 97* 103 103   CO2 mmol/L 27.0 28.0 28.0   BUN mg/dL 18 13 24*   CREATININE mg/dL 0.54* 0.47*  0.61   GLUCOSE mg/dL 114* 101* 90   CALCIUM mg/dL 9.9 8.6 9.0   ALT (SGPT) U/L  --   --  39*   AST (SGOT) U/L  --   --  19     Estimated Creatinine Clearance: 142.6 mL/min (A) (by C-G formula based on SCr of 0.54 mg/dL (L)).    Microbiology Results Abnormal     Procedure Component Value - Date/Time    COVID PRE-OP / PRE-PROCEDURE SCREENING ORDER (NO ISOLATION) - Swab, Nasopharynx [[id number]]  (Normal) Collected: 03/02/21 1252    Lab Status: Final result Specimen: Swab from Nasopharynx Updated: 03/02/21 1331    Narrative:      The following orders were created for panel order COVID PRE-OP / PRE-PROCEDURE SCREENING ORDER (NO ISOLATION) - Swab, Nasopharynx.  Procedure                               Abnormality         Status                     ---------                               -----------         ------                     COVID-19, ABBOTT IN-HOUS...[[id number]]  Normal              Final result                 Please view results for these tests on the individual orders.    COVID-19, ABBOTT IN-HOUSE,NASAL Swab (NO TRANSPORT MEDIA) 2 HR TAT - Swab, Nasopharynx [[id number]]  (Normal) Collected: 03/02/21 1252    Lab Status: Final result Specimen: Swab from Nasopharynx Updated: 03/02/21 1331     COVID19 Presumptive Negative    Narrative:      Fact sheet for providers: https://www.fda.gov/media/366385/download     Fact sheet for patients: https://www.fda.gov/media/797040/download    Test performed by PCR.  If inconsistent with clinical signs and symptoms patient should be tested with different authorized molecular test.          Imaging Results (Last 24 Hours)     ** No results found for the last 24 hours. **              I have reviewed the medications:  Scheduled Meds:acetaminophen, 1,000 mg, Oral, Q8H  dexamethasone, 4 mg, Intravenous, Q6H  famotidine, 20 mg, Oral, BID AC  ibuprofen, 800 mg, Oral, Q8H  lidocaine PF 1%, 5 mL, Injection, Once  methylPREDNISolone acetate, 80 mg, Intra-articular, Once  ondansetron, 4  mg, Intravenous, Once  polyethylene glycol, 17 g, Oral, Daily  pregabalin, 75 mg, Oral, Q12H  rOPINIRole, 2 mg, Oral, BID  sodium chloride, 3 mL, Intravenous, Q12H  valACYclovir, 1,000 mg, Oral, Daily  venlafaxine XR, 150 mg, Oral, Daily      Continuous Infusions:lactated ringers, 9 mL/hr, Last Rate: 9 mL/hr (03/02/21 1348)      PRN Meds:.albuterol sulfate HFA  •  diazePAM  •  HYDROcodone-acetaminophen  •  [COMPLETED] Insert peripheral IV **AND** sodium chloride    Assessment/Plan   Assessment & Plan     Active Hospital Problems    Diagnosis  POA   • **Spondylolisthesis of lumbar region [M43.16]  Yes   • Lumbar spinal stenosis [M48.061]  Yes   • Tobacco abuse [Z72.0]  Yes   • Class 1 obesity due to excess calories with serious comorbidity and body mass index (BMI) of 30.0 to 30.9 in adult [E66.09, Z68.30]  Not Applicable   • Herpes [B00.9]  No   • Anxiety and depression [F41.9, F32.9]  Yes      Resolved Hospital Problems   No resolved problems to display.        Brief Hospital Course to date:  Shahana Cristobal is a 50 y.o. female with a PMHx significant for lumbar spinal stenosis, HSV, anxiety, depression, obesity, and tobacco abuse who presented to the ED on 3/2/21 with complaints of right leg pain. Dr. Jefferson admitted and performed a right sided L4-5 lamina foraminotomy, right sided resection synovial cyst, and right sided hemilaminectomy L5 cephalad down with generous foraminotomy exiting foramina L5 under the pedicle. Hospital medicine was consulted to assist with medical management.      This patient's problems and plans were partially entered by my partner and updated as appropriate by me 03/07/21.     Spondylolisthesis of lumbar region   Lumbar spinal stenosis  Right foot drop  --S/p L4-5 discectomy   --Pain control; Percocet and Neurontin ordered per Neurosurgery  --Ambulate per neurosurgery recommendations  --PT feels that patient is very unstable to go home by herself even with a rolling walker due to  inability to ambulate on her own; recommends inpatient therapy  --WBC 13.98 this AM   -Start IV Decadron 4 mg every 6 hours per NS  -PT/OT recommending inpatient rehab   --AM labs      Right hip labrum tear  --MRI of right hip this morning per neurosurgery shows a right hip labrum tear  --Ortho following  --s/p hip injection under fluro 3/5/21     Anxiety and depression  --Continue home dose of Effexor     HSV  --Continue home dose valacyclovir     Tobacco abuse  --NRT  --Tobacco cessation education  --PRN albuterol     Obesity  --Has lost 118 pounds in the last year (on purpose)     Thank you for allowing [facility] Medicine Service to provide consultative care for your patient, we will continue to follow while clinically appropriate.    CODE STATUS:   Code Status and Medical Interventions:   Ordered at: 03/02/21 2046     Level Of Support Discussed With:    Patient     Code Status:    CPR     Medical Interventions (Level of Support Prior to Arrest):    Full       Justin Sweeney, APRN  03/07/21

## 2021-03-07 NOTE — PROGRESS NOTES
"NEUROSURGERY PROGRESS NOTE     LOS: 5 days   Patient Care Team:  Estela Garrett MD as PCP - General (Internal Medicine)  Stephen Resendiz MD as Obstetrician (Obstetrics and Gynecology)  Kingsley Dawson MD as Consulting Physician (Neurosurgery)  Ninfa Lucero, RN as Ambulatory  ([facility])    Chief Complaint: Right lower extremity pain.    POD#: 5 Days Post-Op  Procedures:  Right L5 hemilaminectomy with L4-5 foraminotomy and synovial cyst resection.    Interval History:   Patient Complaints: Right hip and inguinal pain that extends down into the side of her right calf.  Patient Denies: Left lower extremity symptoms or voiding difficulty.    Vital Signs: Blood pressure 143/85, pulse 80, temperature 98.4 °F (36.9 °C), temperature source Oral, resp. rate 16, height 167.6 cm (66\"), weight 94.3 kg (208 lb), SpO2 97 %, not currently breastfeeding.  Intake/Output:     Intake/Output Summary (Last 24 hours) at 3/7/2021 0809  Last data filed at 3/7/2021 0307  Gross per 24 hour   Intake 1560 ml   Output 3925 ml   Net -2365 ml       Physical Exam:  The patient is tearful and moves about very uncomfortably in bed.  Lumbar incision looks quite good.  Her motor exam in her right lower extremity is limited due to her reported pain.  There is decreased dorsiflexion of the right foot which is interesting given that that was at least 4/5 yesterday.  Leg extension on the right is at least 3/5.  Once again her exam is quite limited due her to her reported pain.     Assessment/Plan:  1.  Right L4-5 synovial cyst and stenosis status post decompression.  2.  Right hip labrum tear status post injection.  3.  Anxiety and depression.  4.  Disposition: Mobilize patient.  Patient is having a more difficult time today.  Continue symptomatic treatment.  I am going to try little IV Decadron as well.      Simeon Varela MD  03/07/21  08:09 EST    "

## 2021-03-07 NOTE — PLAN OF CARE
Goal Outcome Evaluation:         Pt is alert and oriented X 4. VSS. Pain controlled with po med. Zofran 4mg po given for nausea. Pt was emotional about restless leg. She expressed her wish to be discontinued home today.

## 2021-03-07 NOTE — PLAN OF CARE
Goal Outcome Evaluation:  Plan of Care Reviewed With: patient  Progress: no change  Outcome Summary: Alert, oriented x 4, very tearful and anxious. Medication given for anxiety. A lot of c/o pain, medication given for pain. Was able to sleep for a couple hours today. Stated she felt better this afternoon. She is very unsteady on her feet. Will continue to monitor.

## 2021-03-07 NOTE — PLAN OF CARE
Goal Outcome Evaluation:  Plan of Care Reviewed With: patient  Progress: no change  Outcome Summary: Pt is showing decreased motor activation in right LE and impaired sensation and proprioception as well. She is very impulsive and unsafe with all mobility. STS with mod A but limited with posterior lean and inability to position RLE underneath herself for standing balance. Transferred bed > chair with mod A and BUE support with right KI donned. Gait not safe to perform this date and p having increased right hip pain with standing. Spent additional time with edu on needs for IRF to improve IND with mobility in a safe manner. Strongly recommend IRF at d/c.
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
## 2021-03-08 ENCOUNTER — READMISSION MANAGEMENT (OUTPATIENT)
Dept: CALL CENTER | Facility: HOSPITAL | Age: 51
End: 2021-03-08

## 2021-03-08 VITALS
BODY MASS INDEX: 33.43 KG/M2 | HEART RATE: 75 BPM | TEMPERATURE: 98 F | WEIGHT: 208 LBS | OXYGEN SATURATION: 94 % | HEIGHT: 66 IN | DIASTOLIC BLOOD PRESSURE: 93 MMHG | SYSTOLIC BLOOD PRESSURE: 156 MMHG | RESPIRATION RATE: 16 BRPM

## 2021-03-08 LAB
ANION GAP SERPL CALCULATED.3IONS-SCNC: 13 MMOL/L (ref 5–15)
BASOPHILS # BLD AUTO: 0.04 10*3/MM3 (ref 0–0.2)
BASOPHILS NFR BLD AUTO: 0.2 % (ref 0–1.5)
BUN SERPL-MCNC: 25 MG/DL (ref 6–20)
BUN/CREAT SERPL: 40.3 (ref 7–25)
CALCIUM SPEC-SCNC: 10.6 MG/DL (ref 8.6–10.5)
CHLORIDE SERPL-SCNC: 99 MMOL/L (ref 98–107)
CO2 SERPL-SCNC: 25 MMOL/L (ref 22–29)
CREAT SERPL-MCNC: 0.62 MG/DL (ref 0.57–1)
DEPRECATED RDW RBC AUTO: 48.1 FL (ref 37–54)
EOSINOPHIL # BLD AUTO: 0 10*3/MM3 (ref 0–0.4)
EOSINOPHIL NFR BLD AUTO: 0 % (ref 0.3–6.2)
ERYTHROCYTE [DISTWIDTH] IN BLOOD BY AUTOMATED COUNT: 14.9 % (ref 12.3–15.4)
GFR SERPL CREATININE-BSD FRML MDRD: 101 ML/MIN/1.73
GLUCOSE SERPL-MCNC: 123 MG/DL (ref 65–99)
HCT VFR BLD AUTO: 41.1 % (ref 34–46.6)
HGB BLD-MCNC: 13.2 G/DL (ref 12–15.9)
IMM GRANULOCYTES # BLD AUTO: 0.32 10*3/MM3 (ref 0–0.05)
IMM GRANULOCYTES NFR BLD AUTO: 1.8 % (ref 0–0.5)
LYMPHOCYTES # BLD AUTO: 2.04 10*3/MM3 (ref 0.7–3.1)
LYMPHOCYTES NFR BLD AUTO: 11.2 % (ref 19.6–45.3)
MCH RBC QN AUTO: 28.7 PG (ref 26.6–33)
MCHC RBC AUTO-ENTMCNC: 32.1 G/DL (ref 31.5–35.7)
MCV RBC AUTO: 89.3 FL (ref 79–97)
MONOCYTES # BLD AUTO: 0.57 10*3/MM3 (ref 0.1–0.9)
MONOCYTES NFR BLD AUTO: 3.1 % (ref 5–12)
NEUTROPHILS NFR BLD AUTO: 15.3 10*3/MM3 (ref 1.7–7)
NEUTROPHILS NFR BLD AUTO: 83.7 % (ref 42.7–76)
NRBC BLD AUTO-RTO: 0 /100 WBC (ref 0–0.2)
PLATELET # BLD AUTO: 524 10*3/MM3 (ref 140–450)
PMV BLD AUTO: 9.6 FL (ref 6–12)
POTASSIUM SERPL-SCNC: 4.7 MMOL/L (ref 3.5–5.2)
RBC # BLD AUTO: 4.6 10*6/MM3 (ref 3.77–5.28)
SODIUM SERPL-SCNC: 137 MMOL/L (ref 136–145)
WBC # BLD AUTO: 18.27 10*3/MM3 (ref 3.4–10.8)

## 2021-03-08 PROCEDURE — 99232 SBSQ HOSP IP/OBS MODERATE 35: CPT | Performed by: NURSE PRACTITIONER

## 2021-03-08 PROCEDURE — 99231 SBSQ HOSP IP/OBS SF/LOW 25: CPT | Performed by: PHYSICIAN ASSISTANT

## 2021-03-08 PROCEDURE — 97535 SELF CARE MNGMENT TRAINING: CPT

## 2021-03-08 PROCEDURE — 80048 BASIC METABOLIC PNL TOTAL CA: CPT | Performed by: NURSE PRACTITIONER

## 2021-03-08 PROCEDURE — 25010000002 DEXAMETHASONE PER 1 MG: Performed by: NEUROLOGICAL SURGERY

## 2021-03-08 PROCEDURE — 85025 COMPLETE CBC W/AUTO DIFF WBC: CPT | Performed by: NURSE PRACTITIONER

## 2021-03-08 RX ORDER — FAMOTIDINE 20 MG/1
20 TABLET, FILM COATED ORAL
Qty: 60 TABLET | Refills: 3 | Status: SHIPPED | OUTPATIENT
Start: 2021-03-08 | End: 2021-03-09

## 2021-03-08 RX ORDER — FLUCONAZOLE 200 MG/1
200 TABLET ORAL DAILY
Qty: 3 TABLET | Refills: 0 | Status: SHIPPED | OUTPATIENT
Start: 2021-03-08

## 2021-03-08 RX ADMIN — SODIUM CHLORIDE, PRESERVATIVE FREE 3 ML: 5 INJECTION INTRAVENOUS at 04:35

## 2021-03-08 RX ADMIN — ACETAMINOPHEN 1000 MG: 500 TABLET ORAL at 09:17

## 2021-03-08 RX ADMIN — VENLAFAXINE HYDROCHLORIDE 150 MG: 75 CAPSULE, EXTENDED RELEASE ORAL at 09:17

## 2021-03-08 RX ADMIN — HYDROCODONE BITARTRATE AND ACETAMINOPHEN 1 TABLET: 7.5; 325 TABLET ORAL at 05:36

## 2021-03-08 RX ADMIN — DIAZEPAM 5 MG: 5 TABLET ORAL at 11:30

## 2021-03-08 RX ADMIN — ROPINIROLE HYDROCHLORIDE 2 MG: 2 TABLET, FILM COATED ORAL at 09:18

## 2021-03-08 RX ADMIN — DEXAMETHASONE SODIUM PHOSPHATE 4 MG: 4 INJECTION, SOLUTION INTRA-ARTICULAR; INTRALESIONAL; INTRAMUSCULAR; INTRAVENOUS; SOFT TISSUE at 04:28

## 2021-03-08 RX ADMIN — ACETAMINOPHEN 1000 MG: 500 TABLET ORAL at 03:00

## 2021-03-08 RX ADMIN — VALACYCLOVIR HYDROCHLORIDE 1000 MG: 500 TABLET, FILM COATED ORAL at 09:18

## 2021-03-08 RX ADMIN — FAMOTIDINE 20 MG: 20 TABLET, FILM COATED ORAL at 06:12

## 2021-03-08 RX ADMIN — HYDROCODONE BITARTRATE AND ACETAMINOPHEN 1 TABLET: 7.5; 325 TABLET ORAL at 11:28

## 2021-03-08 RX ADMIN — IBUPROFEN 800 MG: 800 TABLET, FILM COATED ORAL at 05:39

## 2021-03-08 RX ADMIN — PREGABALIN 75 MG: 75 CAPSULE ORAL at 09:17

## 2021-03-08 RX ADMIN — DIAZEPAM 5 MG: 5 TABLET ORAL at 06:11
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
## 2021-03-08 NOTE — PLAN OF CARE
Goal Outcome Evaluation:  Plan of Care Reviewed With: patient  Progress: improving  Outcome Summary: Patient has demonstrated improving R tib ant strength today 3+/5. She does fatigue quickly on that R leg after walking. I have issued an AFO for improved ankle control.  She ambulated 350 feet with walker today. Ready for home
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
## 2021-03-08 NOTE — PROGRESS NOTES
Continued Stay Note  [facility]     Patient Name: Shahana Cristobal  MRN: [id number]  Today's Date: 3/8/2021    Admit Date: 3/2/2021    Discharge Plan     Row Name 03/08/21 1038       Plan    Plan  Home    Patient/Family in Agreement with Plan  yes    Plan Comments  Per April with [facility], the [facility] MD has declined patient for admission as patient does not meet medical need for acute rehab. Per discussion in MDR, patient's mobility has improved and patient will DC today. Met with patient in the room, and she denies any discharge needs.    Final Discharge Disposition Code  01 - home or self-care        Discharge Codes    No documentation.       Expected Discharge Date and Time     Expected Discharge Date Expected Discharge Time    Mar 8, 2021             Nanci Pereira RN

## 2021-03-08 NOTE — PLAN OF CARE
Problem: Adult Inpatient Plan of Care  Goal: Plan of Care Review  Recent Flowsheet Documentation  Taken 3/8/2021 1058 by Lidia Persaud OT  Progress: improving  Plan of Care Reviewed With: patient  Outcome Summary: Pt alert, O x 4, observed ambulating in room without AFO and AD. OT educated pt on recommendation for both in fxl mobility, ADL related t/f. Pt verbalized understanding. Pt further grossly educated/re educated on spinal precautions and implications toward ADLs largely LBD, LBB as pt was u/a to verbalize spinal precautions upon inquiry. Pt sat at EOB and deferred demonstrating bed mobility to ensure log roll tech however indicated need for LLE to assist RLE to advance to EOB thus OT educated pt on AE to assist and pt deferred. OT collectively reviewed purpose, tech, seq, care for all AE already issued reiterating implications of spinal prec on ADLs. Pt verbalized understanding yet deferred any authentic training. OT additionally offered pt wipe assist given pt tech/approach for posterior hygiene and pt firmly declined. Pt reported completing all ADLs Kirsten this date, OT has concern for adherence to spinal precautions yet pt deferred training. Inquired about pt having HEP for UB strengthening and pt indicated yes and no need for HEP at this time. This OT session grossly focused on education/re education for planned return to home. Will progress pt as able while hospitalized.

## 2021-03-08 NOTE — PLAN OF CARE
Goal Outcome Evaluation:   VSS. Pt has regained some movement in right ankle. Pt treated with valium and norco twice this shift for severe muscle spasms. P[t stating she is having vaginal discomfort and feels she may have a yeast infection from antibiotic therapy.

## 2021-03-08 NOTE — DISCHARGE SUMMARY
[facility] Medicine Services  DISCHARGE SUMMARY    Patient Name: Shahana Cristobal  : 1970  MRN: [id number]    Date of Admission: 2021  9:28 PM  Date of Discharge:  3/1/21  Primary Care Physician: Estela Garrett MD    Consults     Date and Time Order Name Status Description    2021  1:43 AM Inpatient Neurosurgery Consult Completed           Hospital Course     Presenting Problem:   Sciatica [M54.30]  Sciatica [M54.30]    Active Hospital Problems    Diagnosis  POA   • **Sciatica [M54.30]  Yes   • Leukocytosis [D72.829]  Yes   • Tobacco abuse [Z72.0]  Yes   • DDD (degenerative disc disease), lumbosacral [M51.37]  Yes   • Anxiety and depression [F41.9, F32.9]  Yes   • RA (rheumatoid arthritis) (CMS/[facility]) [M06.9]  Yes      Resolved Hospital Problems   No resolved problems to display.          Hospital Course:  Shahana Cristobal is a 50 y.o. female with past medical history significant for degenerative disc disease, rheumatoid arthritis, anxiety and depression, tobacco abuse.  Patient was seen in the office of neurosurgery a few days prior to admission for uncontrollable pain. Patient came to the emergency room on 2021 for severe pain..  Patient was admitted for uncontrollable severe pain particularly in the right lower extremities. shealso had significant difficulty with ambulation.  Neurosurgery was consulted and they followed the patient while she was here.  Imaging revealed right L4-L5 synovial cyst.  Also facet complex causing right leg pain.  Degenerative disc disease was also evident from L5-S1 with disc bulge and bilateral foraminal narrowing.  Patient was given pain medication and eventually pain was controlled.  Unfortunately patient could not be scheduled for surgery.  Hence she was discharged home with oral pain medication to come back pain neurosurgery could schedule surgery.  At the time of discharge patient's pain was controlled.  She was  hemodynamically stable and labs and vitals were within acceptable limits.          COVID Symptom Date of Onset:  ______  Date of first positive COVID test: ____  Quarantine Complete 20 days after date of onset:_______      Day of Discharge     HPI:   Resting in bed in no acute distress but she is anxious about the living condition.  Evidently she lives alone here and her daughter and the rest of the family live several hours away.  However, her daughter has agreed to come and stay with the patient until after the surgery.  No fever or chills.  No chest pain or palpitation or shortness of breath.  No nausea, vomiting, diarrhea, abdominal pain.    Review of Systems      Vital Signs:         Physical Exam:  Constitutional: No acute distress  HENT: NCAT, mucous membranes moist  Respiratory: Clear to auscultation bilaterally, respiratory effort normal   Cardiovascular: RRR, no murmurs, rubs, or gallops  Gastrointestinal: Abdomen is very obese.  Positive bowel sounds, soft, nontender, nondistended  Musculoskeletal: No bilateral ankle edema, morbid obesity.  Psychiatric: Anxious, cooperative  Neurologic: Oriented x 3, strength symmetric in all extremities, Cranial Nerves grossly intact to confrontation, speech clear  Skin: No rashes    Pertinent  and/or Most Recent Results     LAB RESULTS:      Lab 03/07/21  0539 03/03/21  0720 03/02/21  1258 03/02/21  1140   WBC 13.98* 14.33*  --  14.88*   HEMOGLOBIN 14.0 11.0*  --  12.0   HEMATOCRIT 43.5 34.5  --  37.0   PLATELETS 504* 412  --  451*   NEUTROS ABS 10.07*  --   --  7.74*   IMMATURE GRANS (ABS) 0.18*  --   --   --    LYMPHS ABS 2.88  --   --   --    MONOS ABS 0.74  --   --   --    EOS ABS 0.05  --   --  0.00   MCV 89.0 89.8  --  89.2   SED RATE  --   --   --  8   CRP  --   --   --  <0.30   PROTIME  --   --  11.9  --          Lab 03/07/21  0539 03/03/21  0720 03/02/21  1140   SODIUM 137 139 141   POTASSIUM 4.6 4.4 3.9   CHLORIDE 97* 103 103   CO2 27.0 28.0 28.0   ANION GAP  13.0 8.0 10.0   BUN 18 13 24*   CREATININE 0.54* 0.47* 0.61   GLUCOSE 114* 101* 90   CALCIUM 9.9 8.6 9.0   MAGNESIUM 2.1  --   --          Lab 03/02/21  1140   TOTAL PROTEIN 6.7   ALBUMIN 4.10   GLOBULIN 2.6   ALT (SGPT) 39*   AST (SGOT) 19   BILIRUBIN <0.2   ALK PHOS 65         Lab 03/02/21  1258   PROTIME 11.9   INR 0.91                 Brief Urine Lab Results  (Last result in the past 365 days)      Color   Clarity   Blood   Leuk Est   Nitrite   Protein   CREAT   Urine HCG        03/02/21 1305               Negative         Microbiology Results (last 10 days)     Procedure Component Value - Date/Time    COVID PRE-OP / PRE-PROCEDURE SCREENING ORDER (NO ISOLATION) - Swab, Nasopharynx [[id number]]  (Normal) Collected: 03/02/21 1252    Lab Status: Final result Specimen: Swab from Nasopharynx Updated: 03/02/21 1331    Narrative:      The following orders were created for panel order COVID PRE-OP / PRE-PROCEDURE SCREENING ORDER (NO ISOLATION) - Swab, Nasopharynx.  Procedure                               Abnormality         Status                     ---------                               -----------         ------                     COVID-19, ABBOTT IN-HOUS...[[id number]]  Normal              Final result                 Please view results for these tests on the individual orders.    COVID-19, ABBOTT IN-HOUSE,NASAL Swab (NO TRANSPORT MEDIA) 2 HR TAT - Swab, Nasopharynx [[id number]]  (Normal) Collected: 03/02/21 1252    Lab Status: Final result Specimen: Swab from Nasopharynx Updated: 03/02/21 1331     COVID19 Presumptive Negative    Narrative:      Fact sheet for providers: https://www.fda.gov/media/727834/download     Fact sheet for patients: https://www.fda.gov/media/403544/download    Test performed by PCR.  If inconsistent with clinical signs and symptoms patient should be tested with different authorized molecular test.    Urine Culture - Urine, Urine, Clean Catch [[id number]]  (Abnormal) Collected: 02/28/21 0256     Lab Status: Final result Specimen: Urine, Clean Catch Updated: 03/01/21 1159     Urine Culture 50,000 CFU/mL Streptococcus, Alpha Hemolytic     Comment: This organism commonly represents colonization or contamination. No further workup unless requested by provider.         Yeast isolated     Comment: No further work up.       COVID-19 and FLU A/B PCR - Swab, Nasopharynx [[id number]]  (Normal) Collected: 02/27/21 2319    Lab Status: Final result Specimen: Swab from Nasopharynx Updated: 02/28/21 0003     COVID19 Not Detected     Influenza A PCR Not Detected     Influenza B PCR Not Detected    Narrative:      Fact sheet for providers: https://www.fda.gov/media/153263/download    Fact sheet for patients: https://www.fda.gov/media/656636/download    Test performed by PCR.          CT Lumbar Spine Without Contrast    Result Date: 2/27/2021  EXAMINATION: CT LUMBAR SPINE WO CONTRAST - 02/26/2021  INDICATION: M43.16-Spondylolisthesis, lumbar region. Right leg pain, L4-5 spondylolisthesis.  TECHNIQUE: Spiral acquisition 2 mm axial images of the lumbar spine with sagittal and coronal 2 mm 2D reconstructions.  The radiation dose reduction device was turned on for each scan per the ALARA (As Low as Reasonably Achievable) protocol.  COMPARISON: Concurrent lumbar MRI. Previous lumbar plain films 02/16/2020.  FINDINGS: Patient history indicates a history of new lower back pain radiating to right groin and leg, and right leg numbness.  Lumbar body heights are well-maintained. There is minimal grade 1 anterior subluxation of L4 with respect to L3 and L5, and advanced L5-S1 degenerative disc disease. There is extensive posterior element hypertrophy at L4-5 and L5-S1 and a degenerative bone cyst of the left L5 pars interarticularis. No pars defect or pedicle fracture is seen. Degenerative cyst formation and bony erosive changes are seen from degenerative changes on the contralateral side, but are not thought to be acute.  Axial  images show no evidence of significant canal or foraminal stenosis at the included T11-12 or T12-L1 levels.  At L1-2, canal and foramina appear normal.  At L2-3, canal and foramina appear normal.  At L3-4, canal appears lower limits of normal diameter due to a broad-based disc bulge. Foramina appear normally patent.  At L4-5, there is a broad-based disc protrusion with moderate canal stenosis. There is heterogeneous material of mixed density in the right lateral recess, corresponding to patient's MRI study. Most of this area is iso- or slightly hyperdense to disc. There are also clearly some air bubbles from a degenerative facet cyst. Air is seen in both degenerated facet joints at the L4-5 level. There may be a trace amount of fluid in the cyst also.  At L5-S1, canal and foramina appear within normal limits.  Review of the axial images shows no evidence of pathologic paraspinous mass or acute inflammatory focus.      1. Moderate L4-5 canal stenosis due to a broad-based disc protrusion.  2. Extensive bilateral L4-5 facet hypertrophy with degenerative and erosive changes, all thought to represent osteoarthritis.  3. Heterogeneous material filling the right L4-5 lateral recess as seen on MRI, possibly a combination of disc fragment and degenerative facet air-and-fluid-containing synovial cyst.  4. Minor degenerative changes at the remaining levels as discussed above.  DICTATED:   02/26/2021 EDITED/ls :   02/26/2021       This report was finalized on 2/27/2021 7:57 AM by Dr. Rayo Lozano MD.      MRI Lumbar Spine Without Contrast    Result Date: 2/27/2021  EXAMINATION: MRI LUMBAR SPINE WO CONTRAST - 02/26/2021  INDICATION: M43.16-Spondylolisthesis, lumbar region. Right leg pain.  TECHNIQUE: Sagittal T1 and T2 STIR, axial T2-weighted images of the lumbar spine.  COMPARISON: 08/02/2020 lumbar spine MRI.  FINDINGS: Patient history indicates four-week history of right lower back pain radiating to right groin and leg, left  "leg numbness. Previous study from 08/02/2020 by report showed some edema in the pedicles at L5 and degenerative change at L4-5 where there is moderate central spinal canal stenosis.  Today's exam shows no new abnormality of vertebral alignment. No compression deformity or vertebral marrow edema is seen. There is again a suggestion of mild edema in the L4 and L5 pedicles without evidence of well-defined underlying lesion or fracture line. No significant focal subluxation is seen. Canal narrowing at L4-5 appears stable to slightly further increased. There is an L5-S1 disc protrusion which is stable to minimally more prominent than on the prior study. No new HNP is identified. Tip of the conus medullaris lies at L1.  Axial images show no evidence of central spinal stenosis at T12-L1.  L1-2, canal and foramina appear within normal limits.  L2-3, canal and foramina appear normal.  L3-4, canal and foramina appear normal  L4-5, there is again a suggestion moderate canal stenosis, but with a new low signal rounded \"mass\" filling the right lateral recess, measuring 8 mm in diameter, axial images #19 and #20 of series 5. This appears to be new from the prior exam.  L5-S1, there is a broad-based disc protrusion but no apparent canal stenosis. Foramina appear lower limits of normal diameter.  Review of the axial images shows no evidence of pathologic paraspinous mass or acute inflammatory focus.      1. Stable, moderate stenosis at the L4-5 disc level due to facet DJD and a broad-based disc bulge.  2. 8 mm \"mass\" practically filling the right lateral recess of L4-5, favored to represent extruded disc fragment from MRI appearance, and new since 08/2020 scan.  There is a concurrent CT scan which suggests a type of facet synovial cyst may be present here.  Please see that report.  3. Marrow edema again noted in the L4 and L5 pedicles.  4. No evidence of significant new lumbar disease is appreciated elsewhere.  DICTATED:   " 02/26/2021 EDITED/ls :   02/26/2021   This report was finalized on 2/27/2021 1:42 PM by Dr. Rayo Lozano MD.      XR Chest 1 View    Result Date: 2/28/2021  CHEST X-RAY, 2/20/2021  HISTORY:  50-year-old female hospital inpatient with back pain.  TECHNIQUE: AP portable chest x-ray.  FINDINGS: Heart size and pulmonary vascularity are within normal limits. The lungs appear clear. No visible pulmonary infiltrate or pleural effusion.     Negative chest. Signer Name: Chicho Hernandez MD  Signed: 2/28/2021 2:43 AM  [facility]    MRI Hip Right Without Contrast    Result Date: 3/4/2021  EXAMINATION: MRI HIP RIGHT WO CONTRAST- 03/04/2021  INDICATION: R hip pain; M51.26-Other intervertebral disc displacement, lumbar region; M54.16-Radiculopathy, lumbar region; M79.604-Pain in right leg; G95.89-Other specified diseases of spinal cord; M43.16-Spondylolisthesis, lumbar region  TECHNIQUE: Multiplanar MRI of the right hip without intravenous contrast  COMPARISON: Hip x-rays dated 03/02/2021  FINDINGS: Intrapelvic soft tissues demonstrate at least moderate colonic stool burden however no acute inflammatory findings of the lower GI tract or pelvic viscera. No superficial inguinal adenopathy. Pelvic girdle soft tissues without focal fluid collection evident.  Osseous structures demonstrate SI joints symmetric and without widening. No displaced pelvic ring fracture. Degenerative changes of the bilateral hips right asymmetrically greater than left with at least moderate involvement of the right hip and osteophyte formation of the superior acetabular roof which appears mildly hypoplastic or limited covering. Radial tearing of the right superior lateral acetabular labrum without hyaline cartilage interface extension of involvement. No aggressive bone marrow signal findings other than mild degenerative erosions and joint space narrowing.  Left hip demonstrates mild to moderate DJD  along with radial tearing of the acetabular labrum with a small adjacent cyst formation.      Moderate right and mild to moderate left asymmetrically greater right hip DJD with osteophytosis and joint space narrowing however no acute fracture. Radial tearing of the bilateral acetabular labrums as detailed above with a probable trace right hip effusion however no hyaline cartilage interface extension of hyperintense or abnormal signal.   D:  03/04/2021 E:  03/04/2021  This report was finalized on 3/4/2021 4:42 PM by Dr. Yusef Gibbons.      FL C Arm During Surgery    Result Date: 3/4/2021  EXAMINATION: FL C ARM DURING SURGERY-  INDICATION: Lumbar discectomy micro endoscopic; M51.26-Other intervertebral disc displacement, lumbar region; M54.16-Radiculopathy, lumbar region; M79.604-Pain in right leg; G95.89-Other specified diseases of spinal cord; M43.16-Spondylolisthesis, lumbar region.  TECHNIQUE: Intraoperative fluoroscopy for improved localization and treatment planning.  COMPARISON: None.  FINDINGS: Intraoperative fluoroscopy with total fluoroscopic time usage 14 seconds and one image saved during L4-L5 microdiscectomy.      Intraoperative fluoroscopy utilized during L4-L5 microdiscectomy.  D:  03/02/2021 E:  03/03/2021    This report was finalized on 3/4/2021 11:11 AM by Dr. Yusef Gibbons.      XR Hip With or Without Pelvis 2 - 3 View Right    Result Date: 3/2/2021  EXAMINATION: XR HIP W OR WO PELVIS 2-3 VIEW RIGHT-  INDICATION: Hip dislocation right side.  COMPARISON: 02/16/2021 right hip series.  FINDINGS: The bony pelvis appears intact. Hip joints appear anatomically aligned and proximal femurs appear intact. Joint spaces are generally well maintained.  There appear to be only minimal degenerative changes.      Pelvis and hips appear intact. No evidence of subluxation, acute or healing trauma or advanced degenerative change of the right hip.   D:  03/02/2021 E:  03/02/2021  This report was finalized on 3/2/2021  10:47 PM by Dr. Rayo Lozano MD.      FL Guide For Pain Meds Inj    Result Date: 3/5/2021  Exam: Fluoroscopic guided joint injection  INDICATION: Right hip pain  TECHNIQUE: 6 seconds of fluoroscopic time was used for this procedure. 2 associated images were saved. Procedure, risks, complications, and side effects of joint injection were discussed and reviewed in detail with the patient including the possibility for bleeding, infection, and needle damage to surrounding structures. The patient indicated understanding and consented to the procedure.  The right hip was prepped and draped in a sterile fashion. 1% lidocaine was used as a local anesthetic to the skin and subcutaneous tissues. Under fluoroscopic guidance a 20-gauge needle was advanced to the joint space. Approximately 1 cc of Isovue-300 contrast media was injected into the joint space. Next, 2 cc of 40 mg/mL of Depo-Medrol mixed with approximately 5 cc of 1% lidocaine was injected into the joint space. The needle was removed.  FINDINGS: The patient tolerated the procedure well, and no immediate complications occurred.      Successful fluoroscopic guided therapeutic right hip joint injection as above with no immediate complications.  This report was finalized on 3/5/2021 4:02 PM by Robert Serra.        Results for orders placed during the hospital encounter of 02/07/21    Duplex Venous Lower Extremity - Right CAR    Interpretation Summary  · Normal right lower extremity venous duplex scan.      Results for orders placed during the hospital encounter of 02/07/21    Duplex Venous Lower Extremity - Right CAR    Interpretation Summary  · Normal right lower extremity venous duplex scan.              Discharge Details        Discharge Medications      New Medications      Instructions Start Date   carisoprodol 350 MG tablet  Commonly known as: SOMA   350 mg, Oral, Every 6 Hours Scheduled      dexamethasone 4 MG tablet  Commonly known as: DECADRON   4 mg, Oral, 2  Times Daily With Meals         Changes to Medications      Instructions Start Date   valACYclovir 1000 MG tablet  Commonly known as: Valtrex  What changed:   · when to take this  · reasons to take this   1,000 mg, Oral, Daily         Continue These Medications      Instructions Start Date   albuterol sulfate  (90 Base) MCG/ACT inhaler  Commonly known as: PROVENTIL HFA;VENTOLIN HFA;PROAIR HFA   2 puffs, Inhalation, Every 4 Hours PRN      chlorhexidine 4 % external liquid  Commonly known as: HIBICLENS   Shower each day with solution for 5 days beginning 5 days before surgery.      gabapentin 300 MG capsule  Commonly known as: NEURONTIN   300 mg, Oral, 3 Times Daily      ibuprofen 800 MG tablet  Commonly known as: ADVIL,MOTRIN   800 mg, Oral, 3 Times Daily With Meals      rOPINIRole 2 MG tablet  Commonly known as: REQUIP   TAKE ONE TABLET BY MOUTH TWICE A DAY      venlafaxine  MG 24 hr capsule  Commonly known as: EFFEXOR-XR   150 mg, Oral, Daily         Stop These Medications    cyclobenzaprine 10 MG tablet  Commonly known as: FLEXERIL     methylPREDNISolone 4 MG dose pack  Commonly known as: MEDROL     traMADol 50 MG tablet  Commonly known as: ULTRAM        ASK your doctor about these medications      Instructions Start Date   cefuroxime 500 MG tablet  Commonly known as: CEFTIN  Ask about: Should I take this medication?   500 mg, Oral, 2 Times Daily      oxyCODONE-acetaminophen 7.5-325 MG per tablet  Commonly known as: PERCOCET  Ask about: Should I take this medication?   1 tablet, Oral, Every 6 Hours PRN             Allergies   Allergen Reactions   • Tramadol Itching   • Aripiprazole Mental Status Change         Discharge Disposition:  Home or Self Care    Diet:  Hospital:No active diet order      Activity:  Activity Instructions     Activity as Tolerated                   CODE STATUS:    Code Status and Medical Interventions:   Ordered at: 02/28/21 0143     Code Status:    CPR     Medical Interventions  (Level of Support Prior to Arrest):    Full       Future Appointments   Date Time Provider Department Center   3/22/2021 10:45 AM Jeet Trejo PA-C [facility]   3/24/2021  8:00 AM  [facility]   7/19/2021 12:45 PM Estela Garrett MD [facility]       Additional Instructions for the Follow-ups that You Need to Schedule     Discharge Follow-up with PCP   As directed       Currently Documented PCP:    Estela Garrett MD    PCP Phone Number:    [phone]     Follow Up Details: pcp within 5 days         Discharge Follow-up with Specified Provider: neurosurgery as per schedule.   As directed      To: neurosurgery as per schedule.                     Ron Dennison MD  03/07/21      Time Spent on Discharge:  I spent  32 minutes on this discharge activity which included: face-to-face encounter with the patient, reviewing the data in the system, coordination of the care with the nursing staff as well as consultants, documentation, and entering orders.

## 2021-03-08 NOTE — PROGRESS NOTES
[facility]  PROGRESS NOTE    Patient Name: Shahana Cristobal  : 1970  MRN: [id number]    Date of Admission: 3/2/2021  Primary Care Physician: Estela Garrett MD    Subjective   Subjective     CC:  Medical management s/p L4-5 discectomy    HPI:  No issues overnight  Denies complaints  Seen ambulating ini hallway earlier without difficulty    ROS:  Gen- No fevers, chills  CV- No chest pain, palpitations  Resp- No cough, dyspnea  GI- No N/V/D, abd pain    Objective   Objective     Vital Signs:   Temp:  [97.4 °F (36.3 °C)-98.6 °F (37 °C)] 98 °F (36.7 °C)  Heart Rate:  [75-88] 75  Resp:  [16] 16  BP: (120-156)/(72-93) 156/93        Physical Exam:  Constitutional: No acute distress, awake, alert, sitting up eating breakfast  HENT: NCAT, mucous membranes moist  Respiratory: Clear to auscultation bilaterally, respiratory effort normal   Cardiovascular: RRR, no murmurs, rubs, or gallops  Gastrointestinal: Positive bowel sounds, soft, nontender, nondistended, obese  Musculoskeletal: No bilateral ankle edema  Psychiatric: Appropriate affect, cooperative  Neurologic: Oriented x 3, VENTURA, right foot drop, speech clear  Skin: No rashes noted      Results Reviewed:  Results from last 7 days   Lab Units 21  0539 21  0720 21  1258 21  1140   WBC 10*3/mm3 13.98* 14.33*  --  14.88*   HEMOGLOBIN g/dL 14.0 11.0*  --  12.0   HEMATOCRIT % 43.5 34.5  --  37.0   PLATELETS 10*3/mm3 504* 412  --  451*   INR   --   --  0.91  --      Results from last 7 days   Lab Units 21  0539 21  0720 21  1140   SODIUM mmol/L 137 139 141   POTASSIUM mmol/L 4.6 4.4 3.9   CHLORIDE mmol/L 97* 103 103   CO2 mmol/L 27.0 28.0 28.0   BUN mg/dL 18 13 24*   CREATININE mg/dL 0.54* 0.47* 0.61   GLUCOSE mg/dL 114* 101* 90   CALCIUM mg/dL 9.9 8.6 9.0   ALT (SGPT) U/L  --   --  39*   AST (SGOT) U/L  --   --  19     Estimated Creatinine Clearance: 142.6 mL/min (A) (by C-G  formula based on SCr of 0.54 mg/dL (L)).    Microbiology Results Abnormal     Procedure Component Value - Date/Time    COVID PRE-OP / PRE-PROCEDURE SCREENING ORDER (NO ISOLATION) - Swab, Nasopharynx [[id number]]  (Normal) Collected: 03/02/21 1252    Lab Status: Final result Specimen: Swab from Nasopharynx Updated: 03/02/21 1331    Narrative:      The following orders were created for panel order COVID PRE-OP / PRE-PROCEDURE SCREENING ORDER (NO ISOLATION) - Swab, Nasopharynx.  Procedure                               Abnormality         Status                     ---------                               -----------         ------                     COVID-19, ABBOTT IN-HOUS...[[id number]]  Normal              Final result                 Please view results for these tests on the individual orders.    COVID-19, ABBOTT IN-HOUSE,NASAL Swab (NO TRANSPORT MEDIA) 2 HR TAT - Swab, Nasopharynx [[id number]]  (Normal) Collected: 03/02/21 1252    Lab Status: Final result Specimen: Swab from Nasopharynx Updated: 03/02/21 1331     COVID19 Presumptive Negative    Narrative:      Fact sheet for providers: https://www.fda.gov/media/862981/download     Fact sheet for patients: https://www.fda.gov/media/385081/download    Test performed by PCR.  If inconsistent with clinical signs and symptoms patient should be tested with different authorized molecular test.          Imaging Results (Last 24 Hours)     ** No results found for the last 24 hours. **              I have reviewed the medications:  Scheduled Meds:acetaminophen, 1,000 mg, Oral, Q8H  dexamethasone, 4 mg, Intravenous, Q6H  famotidine, 20 mg, Oral, BID AC  ibuprofen, 800 mg, Oral, Q8H  lidocaine PF 1%, 5 mL, Injection, Once  methylPREDNISolone acetate, 80 mg, Intra-articular, Once  ondansetron, 4 mg, Intravenous, Once  polyethylene glycol, 17 g, Oral, Daily  pregabalin, 75 mg, Oral, Q12H  rOPINIRole, 2 mg, Oral, BID  sodium chloride, 3 mL, Intravenous, Q12H  valACYclovir, 1,000  mg, Oral, Daily  venlafaxine XR, 150 mg, Oral, Daily      Continuous Infusions:lactated ringers, 9 mL/hr, Last Rate: 9 mL/hr (03/02/21 1348)      PRN Meds:.albuterol sulfate HFA  •  diazePAM  •  HYDROcodone-acetaminophen  •  [COMPLETED] Insert peripheral IV **AND** sodium chloride    Assessment/Plan   Assessment & Plan     Active Hospital Problems    Diagnosis  POA   • **Spondylolisthesis of lumbar region [M43.16]  Yes   • Lumbar spinal stenosis [M48.061]  Yes   • Tobacco abuse [Z72.0]  Yes   • Class 1 obesity due to excess calories with serious comorbidity and body mass index (BMI) of 30.0 to 30.9 in adult [E66.09, Z68.30]  Not Applicable   • Herpes [B00.9]  No   • Anxiety and depression [F41.9, F32.9]  Yes      Resolved Hospital Problems   No resolved problems to display.        Brief Hospital Course to date:  Shahana Cristobal is a 50 y.o. female with a PMHx significant for lumbar spinal stenosis, HSV, anxiety, depression, obesity, and tobacco abuse who presented to the ED on 3/2/21 with complaints of right leg pain. Dr. Jefferson admitted and performed a right sided L4-5 lamina foraminotomy, right sided resection synovial cyst, and right sided hemilaminectomy L5 cephalad down with generous foraminotomy exiting foramina L5 under the pedicle. Hospital medicine was consulted to assist with medical management.      This patient's problems and plans were partially entered by my partner and updated as appropriate by me 03/08/21.     Spondylolisthesis of lumbar region   Lumbar spinal stenosis  Right foot drop  --s/p L4-5 discectomy   --Pain control; continue norco and lyrica as ordered   --Ambulate per neurosurgery recommendations  --AFO given per PT  --Decadron 4 mg every 6 hours per NS  --ambulated 350ft using walker with PT this am, cleared for home  --labs pending    Right hip labrum tear  --MRI of right hip this morning per neurosurgery shows a right hip labrum tear  --Ortho following  --s/p hip injection under  roro 3/5/21  --follow up with ortho PA in 3-4 weeks     Anxiety and depression  --Continue home dose of Effexor     HSV  --Continue home dose valacyclovir     Tobacco abuse  --Tobacco cessation education  --PRN albuterol     Obesity  Intentional weight loss  --Has lost 118 pounds in the last year     Thank you for allowing [facility] to provide consultative care for your patient, we will continue to follow while clinically appropriate.    CODE STATUS:   Code Status and Medical Interventions:   Ordered at: 03/02/21 7734     Level Of Support Discussed With:    Patient     Code Status:    CPR     Medical Interventions (Level of Support Prior to Arrest):    Full       Natalie Luque, APRN  03/08/21

## 2021-03-09 ENCOUNTER — TRANSITIONAL CARE MANAGEMENT TELEPHONE ENCOUNTER (OUTPATIENT)
Dept: CALL CENTER | Facility: HOSPITAL | Age: 51
End: 2021-03-09

## 2021-03-09 RX ORDER — METHOCARBAMOL 750 MG/1
750 TABLET, FILM COATED ORAL 3 TIMES DAILY
Qty: 60 TABLET | Refills: 0 | Status: SHIPPED | OUTPATIENT
Start: 2021-03-09

## 2021-03-09 RX ORDER — FAMOTIDINE 20 MG/1
20 TABLET, FILM COATED ORAL
Qty: 60 TABLET | Refills: 3 | Status: SHIPPED | OUTPATIENT
Start: 2021-03-09

## 2021-03-09 NOTE — OUTREACH NOTE
Prep Survey      Responses   [facility] patient discharged from?  [place]   Is LACE score < 7 ?  No   Emergency Room discharge w/ pulse ox?  No   Eligibility  [facility]   Date of Admission  03/02/21   Date of Discharge  03/08/21   Discharge Disposition  Home or Self Care   Discharge diagnosis  Spondylolisthesis of lumbar region   Does the patient have one of the following disease processes/diagnoses(primary or secondary)?  Other   Does the patient have Home health ordered?  No   Is there a DME ordered?  No   Prep survey completed?  Yes          Nat Amaral RN

## 2021-03-09 NOTE — OUTREACH NOTE
Call Center TCM Note      Responses   [facility] patient discharged from?  [place]   Does the patient have one of the following disease processes/diagnoses(primary or secondary)?  Other   TCM attempt successful?  No   Unsuccessful attempts  Attempt 2          Alise Savage RN    3/9/2021, 11:22 EST

## 2021-03-09 NOTE — OUTREACH NOTE
Call Center TCM Note      Responses   [facility] patient discharged from?  [place]   Does the patient have one of the following disease processes/diagnoses(primary or secondary)?  Other   TCM attempt successful?  No   Unsuccessful attempts  Attempt 1          Alise Savage RN    3/9/2021, 10:03 EST

## 2021-03-10 ENCOUNTER — TRANSITIONAL CARE MANAGEMENT TELEPHONE ENCOUNTER (OUTPATIENT)
Dept: CALL CENTER | Facility: HOSPITAL | Age: 51
End: 2021-03-10

## 2021-03-10 NOTE — PROGRESS NOTES
Reviewed. Discussed with Priscilla. Patient [place] do [place]CM [place] of [place]. Rescheduled to Monday 3/15 when in state. Nothing further required.

## 2021-03-10 NOTE — OUTREACH NOTE
"Call Center TCM Note      Responses   [facility] patient discharged from?  [place]   Does the patient have one of the following disease processes/diagnoses(primary or secondary)?  Other   TCM attempt successful?  Yes   Call start time  0841   Call end time  0845   Discharge diagnosis  Spondylolisthesis of lumbar region   Meds reviewed with patient/caregiver?  Yes   Is the patient having any side effects they believe may be caused by any medication additions or changes?  No   Does the patient have all medications ordered at discharge?  Yes   Is the patient taking all medications as directed (includes completed medication regime)?  Yes   Comments regarding appointments  Appt with neurosurgery on 3/22/21   Does the patient have a primary care provider?   Yes   Does the patient have an appointment with their PCP within 7 days of discharge?  Yes   Comments regarding PCP  Hospital d/c video visit is on 3/11/21 at 3:00 pm    Has the patient kept scheduled appointments due by today?  N/A   What DME was ordered?  [place] for rolling walker   Has all DME been delivered?  Yes   Psychosocial issues?  No   Psychosocial comments  Pt is staying in [place]   Did the patient receive a copy of their discharge instructions?  Yes   Nursing interventions  Reviewed instructions with patient   What is the patient's perception of their health status since discharge?  Improving   Is the patient/caregiver able to teach back signs and symptoms related to disease process for when to call PCP?  Yes   Is the patient/caregiver able to teach back signs and symptoms related to disease process for when to call 911?  Yes   Is the patient/caregiver able to teach back the hierarchy of who to call/visit for symptoms/problems? PCP, Specialist, Home health nurse, Urgent Care, ED, 911  Yes   If the patient is a current smoker, are they able to teach back resources for cessation?  Not a smoker [Pt states, \"not right now\"]   TCM call completed?  Yes    "       Kely Fink RN    3/10/2021, 08:46 EST
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
## 2021-03-16 ENCOUNTER — TELEPHONE (OUTPATIENT)
Dept: NEUROSURGERY | Facility: CLINIC | Age: 51
End: 2021-03-16

## 2021-03-16 ENCOUNTER — READMISSION MANAGEMENT (OUTPATIENT)
Dept: CALL CENTER | Facility: HOSPITAL | Age: 51
End: 2021-03-16

## 2021-03-16 NOTE — TELEPHONE ENCOUNTER
Provider: Ronny   Caller: Shahana  Time of call: 1021    Phone #: [phone]   Surgery: LUMBAR DISCECTOMY MICRO ENDOSCOPIC   Surgery Date: 03/02/2021    Last visit: 09/09/2020   Next visit: 03/22/2021    BISMARK:         Reason for call:    States when she stands up she has episodes of urinary incontinence. She states she does not have bladder pressure, but can feel it once it comes out, and is aware that she has to go.      Could a PA please call her since Jeet is in the OR.

## 2021-03-16 NOTE — TELEPHONE ENCOUNTER
Patient's main complaint is she continues to have a dystonic bladder that has been present since the initial injury.  Patient states that when she stands up she has a urge incontinence and she cannot feel that she needs to go but then she pretty much completely voids and I can feel it.    Patient states that she does not feel the sensation of being full in her bladder and I have recommended she start going to the bathroom every 3 hours to decrease the urge incontinence/stress incontinence type issue.  Patient denies any other signs or symptoms of cauda equina no increased numbness in the perianal area no feeling of retained urine either.    We will trial the retraining bladder up until the 22nd if she still having issues with this we can refer on to urology for neurogenic bladder.

## 2021-03-16 NOTE — OUTREACH NOTE
Medical Week 2 Survey      Responses   [facility] [place] patient discharged from?  [place]   Does the patient have one of the following disease processes/diagnoses(primary or secondary)?  Other   Week 2 attempt successful?  No   Unsuccessful attempts  Attempt 1          Tiffany Sears RN

## 2021-03-17 ENCOUNTER — READMISSION MANAGEMENT (OUTPATIENT)
Dept: CALL CENTER | Facility: HOSPITAL | Age: 51
End: 2021-03-17

## 2021-03-17 ENCOUNTER — TELEMEDICINE (OUTPATIENT)
Dept: INTERNAL MEDICINE | Facility: CLINIC | Age: 51
End: 2021-03-17

## 2021-03-17 DIAGNOSIS — B00.9 HERPES: ICD-10-CM

## 2021-03-17 DIAGNOSIS — F41.9 ANXIETY: ICD-10-CM

## 2021-03-17 DIAGNOSIS — M54.16 LUMBAR RADICULITIS: Primary | ICD-10-CM

## 2021-03-17 DIAGNOSIS — N39.42 URINARY INCONTINENCE WITHOUT SENSORY AWARENESS: ICD-10-CM

## 2021-03-17 PROCEDURE — 99214 OFFICE O/P EST MOD 30 MIN: CPT | Performed by: INTERNAL MEDICINE

## 2021-03-17 RX ORDER — VALACYCLOVIR HYDROCHLORIDE 1 G/1
1000 TABLET, FILM COATED ORAL DAILY
Qty: 30 TABLET | Refills: 5 | Status: SHIPPED | OUTPATIENT
Start: 2021-03-17

## 2021-03-17 NOTE — PROGRESS NOTES
Transitional Care Follow Up Visit  Subjective     Shahana Cristobal is a 50 y.o. female who presents for a transitional care management visit.    Within 48 business hours after discharge our office contacted her via telephone to coordinate her care and needs.      I reviewed and discussed the details of that call along with the discharge summary, hospital problems, inpatient lab results, inpatient diagnostic studies, and consultation reports with Shahana.     Current outpatient and discharge medications have been reconciled for the patient.  Reviewed by: Estela Garrett MD      Date of TCM Phone Call 3/8/2021   [facility]   Date of Admission 3/2/2021   Date of Discharge 3/8/2021   Discharge Disposition Home or Self Care     Risk for Readmission (LACE) Score: 14 (3/8/2021  6:01 AM)      History of Present Illness   Course During Hospital Stay:  Ms. Cristobal was admitted 3/2-3/8 for intractable low back pain, right foot drop. She had been admitted 2/27-3/1/2021 with imaging revealing L4-L5 synovial cyst and facet complex. She had been discharged with plan for outpatient surgery in the coming days. She returned the following day due to uncontrolled pain. She underwent urgent decompression in OR on 3/2. She had waxing and waning postoperative course with pain from R labral tear which was treated with joint injection under fluoroscopy. She was recommended to discharge to inpatient rehab by PT/OT due to impulsivity but instead chose to go home with family to [place]. She was discharged on valium for anxiety, as well as norco, gabapentin, and methocarbamol for pain. She is getting PT/OT and skilled nursing. She will be fitted for a brace for her RLE foot drop. She notes issues with decreased bladder sensation and urinary incontinence. She has spoken to neurosurgery and reports that this may not be unexpected postop. She is follow up on Monday.     The following portions of the patient's history  were reviewed and updated as appropriate: allergies, current medications, past medical history, past surgical history and problem list.    Review of Systems   Respiratory: Negative.    Cardiovascular: Negative.    Genitourinary:        Urinary incontinence   Musculoskeletal: Positive for gait problem.   Neurological: Positive for weakness.   Psychiatric/Behavioral: The patient is nervous/anxious.        Objective   Physical Exam  Constitutional:       Appearance: She is obese. She is not ill-appearing.   HENT:      Head: Normocephalic and atraumatic.   Eyes:      Conjunctiva/sclera: Conjunctivae normal.   Pulmonary:      Effort: Pulmonary effort is normal. No respiratory distress.   Neurological:      Mental Status: She is alert and oriented to person, place, and time. Mental status is at baseline.   Psychiatric:         Mood and Affect: Mood normal.         Assessment/Plan   Diagnoses and all orders for this visit:    Lumbar radiculitis  - s/p L5 decompression 3/2 with Dr. Jefferson. Intractable pain improved. Still with R foot drop which will be braced. She will work with PT/OT.  - On gabapentin 300mg TID, methocarbamol PRN. Not using norco.  - Follow up with neurosurgery Monday    Anxiety  - On effexor, started on valium PRN by neurosurgery at discharge due to uncontrolled anxiety during admission.  - She understands this is not a long term medication and that it will not be continued. If further control of anxiety is needed will consider alternatives.    Herpes  - She has flare of herpes due to stress. Requests valtrex script. 1000mg daily for suppression sent to pharmacy.    Urinary incontinence without sensory awareness  - Postoperatively she has been unable to sense need to urinate and is subsequently having urinary incontinence.  - Neurosurgery has been contacted and does not suspect cauda equina  - Attempting timed voiding to avoid incontinence  - May have neurogenic bladder    Health Maintenance  - Pap  smear: s/p hysterectomy  - Mammogram: 10/2020 BIRADS 1  - Colonoscopy: 9/9/2020, repeat 10y  - Lung cancer screening: Current smoker, may need at age 54  - HCV: negative  - Immunizations: Pneumovax 7/2020. Tdap 2013. Discuss shingrix next visit.  - Depression screening: negative 7/2020             Return in about 4 months (around 7/19/2021) for as scheduled.     This patient has consented to a telehealth visit via video. The visit was scheduled to comply with patient safety concerns in accordance with CDC recommendations.  I spent 29 minutes in total including but not limited to the 15 minutes spent in direct conversation with this patient.

## 2021-03-17 NOTE — OUTREACH NOTE
Medical Week 2 Survey      Responses   [facility] patient discharged from?  [place]   Does the patient have one of the following disease processes/diagnoses(primary or secondary)?  Other   Week 2 attempt successful?  Yes   Call start time  0726   Discharge diagnosis  Spondylolisthesis of lumbar region   Rescheduled  Rescheduled-pt requested [She is on the phone with her Surgeon presently.]   Call end time  0726          Coty Ruffin RN

## 2021-03-18 ENCOUNTER — READMISSION MANAGEMENT (OUTPATIENT)
Dept: CALL CENTER | Facility: HOSPITAL | Age: 51
End: 2021-03-18

## 2021-03-18 NOTE — OUTREACH NOTE
Medical Week 2 Survey      Responses   [facility] patient discharged from?  [place]   Does the patient have one of the following disease processes/diagnoses(primary or secondary)?  Other   Week 2 attempt successful?  Yes   Call start time  1550   Call end time  1555   Meds reviewed with patient/caregiver?  Yes   Is the patient having any side effects they believe may be caused by any medication additions or changes?  No   Does the patient have all medications ordered at discharge?  Yes   Is the patient taking all medications as directed (includes completed medication regime)?  Yes   Comments regarding appointments  Appt with neurosurgery on 3/22/21   Does the patient have a primary care provider?   Yes   Does the patient have an appointment with their PCP within 7 days of discharge?  Yes   Has the patient kept scheduled appointments due by today?  Yes   What DME was ordered?  [place] for rolling walker   Has all DME been delivered?  Yes   Psychosocial issues?  No   What is the patient's perception of their health status since discharge?  Improving   Is the patient/caregiver able to teach back signs and symptoms related to disease process for when to call PCP?  Yes   Is the patient/caregiver able to teach back signs and symptoms related to disease process for when to call 911?  Yes   Is the patient/caregiver able to teach back the hierarchy of who to call/visit for symptoms/problems? PCP, Specialist, Home health nurse, Urgent Care, ED, 911  Yes   If the patient is a current smoker, are they able to teach back resources for cessation?  Not a smoker   Week 2 Call Completed?  Yes   Graduated  Yes   Is the patient interested in additional calls from an ambulatory ?  NOTE:  applies to high risk patients requiring additional follow-up.  No   Did the patient feel the follow up calls were helpful during their recovery period?  Yes   Was the number of calls appropriate?  Yes   Does the patient have an  Advance Directive or Living Will?  No [setting up POA with sister]   Is the patient/caregiver familiar with Advance Care Planning?  No   Would the patient like more information on Advance Care Planning?  No   Graduated/Revoked comments  Pt is doing ok. Pt is having surgery, and she states we will be calling her in the near future again.          Laura Dotson RN

## 2021-03-22 ENCOUNTER — DOCUMENTATION (OUTPATIENT)
Dept: NEUROSURGERY | Facility: CLINIC | Age: 51
End: 2021-03-22

## 2021-03-22 ENCOUNTER — APPOINTMENT (OUTPATIENT)
Dept: GENERAL RADIOLOGY | Facility: HOSPITAL | Age: 51
End: 2021-03-22

## 2021-03-22 ENCOUNTER — TELEMEDICINE (OUTPATIENT)
Dept: NEUROSURGERY | Facility: CLINIC | Age: 51
End: 2021-03-22

## 2021-03-22 ENCOUNTER — APPOINTMENT (OUTPATIENT)
Dept: MRI IMAGING | Facility: HOSPITAL | Age: 51
End: 2021-03-22

## 2021-03-22 ENCOUNTER — HOSPITAL ENCOUNTER (EMERGENCY)
Facility: HOSPITAL | Age: 51
Discharge: HOME | End: 2021-03-23
Attending: EMERGENCY MEDICINE | Admitting: EMERGENCY MEDICINE

## 2021-03-22 DIAGNOSIS — M48.061 SPINAL STENOSIS OF LUMBAR REGION, UNSPECIFIED WHETHER NEUROGENIC CLAUDICATION PRESENT: ICD-10-CM

## 2021-03-22 DIAGNOSIS — M51.37 DDD (DEGENERATIVE DISC DISEASE), LUMBOSACRAL: Primary | ICD-10-CM

## 2021-03-22 DIAGNOSIS — N39.0 ACUTE UTI (URINARY TRACT INFECTION): Primary | ICD-10-CM

## 2021-03-22 DIAGNOSIS — M25.571 ACUTE RIGHT ANKLE PAIN: ICD-10-CM

## 2021-03-22 DIAGNOSIS — Z72.0 TOBACCO ABUSE: ICD-10-CM

## 2021-03-22 DIAGNOSIS — M54.50 LOW BACK PAIN AT MULTIPLE SITES: ICD-10-CM

## 2021-03-22 LAB
ALBUMIN SERPL-MCNC: 4.9 G/DL (ref 3.5–5.2)
ALBUMIN/GLOB SERPL: 1.5 G/DL
ALP SERPL-CCNC: 62 U/L (ref 39–117)
ALT SERPL W P-5'-P-CCNC: 27 U/L (ref 1–33)
ANION GAP SERPL CALCULATED.3IONS-SCNC: 13 MMOL/L (ref 5–15)
APTT PPP: 34.1 SECONDS (ref 24–37)
AST SERPL-CCNC: 26 U/L (ref 1–32)
BACTERIA UR QL AUTO: ABNORMAL /HPF
BASOPHILS # BLD AUTO: 0.05 10*3/MM3 (ref 0–0.2)
BASOPHILS NFR BLD AUTO: 0.3 % (ref 0–1.5)
BILIRUB SERPL-MCNC: 0.4 MG/DL (ref 0–1.2)
BILIRUB UR QL STRIP: NEGATIVE
BUN SERPL-MCNC: 8 MG/DL (ref 6–20)
BUN/CREAT SERPL: 12.3 (ref 7–25)
CALCIUM SPEC-SCNC: 9.9 MG/DL (ref 8.6–10.5)
CHLORIDE SERPL-SCNC: 101 MMOL/L (ref 98–107)
CLARITY UR: ABNORMAL
CO2 SERPL-SCNC: 26 MMOL/L (ref 22–29)
COLOR UR: YELLOW
CREAT SERPL-MCNC: 0.65 MG/DL (ref 0.57–1)
DEPRECATED RDW RBC AUTO: 48.7 FL (ref 37–54)
EOSINOPHIL # BLD AUTO: 0.06 10*3/MM3 (ref 0–0.4)
EOSINOPHIL NFR BLD AUTO: 0.4 % (ref 0.3–6.2)
ERYTHROCYTE [DISTWIDTH] IN BLOOD BY AUTOMATED COUNT: 15.1 % (ref 12.3–15.4)
GFR SERPL CREATININE-BSD FRML MDRD: 96 ML/MIN/1.73
GLOBULIN UR ELPH-MCNC: 3.2 GM/DL
GLUCOSE SERPL-MCNC: 98 MG/DL (ref 65–99)
GLUCOSE UR STRIP-MCNC: NEGATIVE MG/DL
HCT VFR BLD AUTO: 36.6 % (ref 34–46.6)
HGB BLD-MCNC: 12 G/DL (ref 12–15.9)
HGB UR QL STRIP.AUTO: ABNORMAL
HYALINE CASTS UR QL AUTO: ABNORMAL /LPF
IMM GRANULOCYTES # BLD AUTO: 0.05 10*3/MM3 (ref 0–0.05)
IMM GRANULOCYTES NFR BLD AUTO: 0.3 % (ref 0–0.5)
INR PPP: 0.93 (ref 0.85–1.16)
KETONES UR QL STRIP: NEGATIVE
LEUKOCYTE ESTERASE UR QL STRIP.AUTO: ABNORMAL
LYMPHOCYTES # BLD AUTO: 4.7 10*3/MM3 (ref 0.7–3.1)
LYMPHOCYTES NFR BLD AUTO: 32.3 % (ref 19.6–45.3)
MCH RBC QN AUTO: 28.8 PG (ref 26.6–33)
MCHC RBC AUTO-ENTMCNC: 32.8 G/DL (ref 31.5–35.7)
MCV RBC AUTO: 88 FL (ref 79–97)
MONOCYTES # BLD AUTO: 0.7 10*3/MM3 (ref 0.1–0.9)
MONOCYTES NFR BLD AUTO: 4.8 % (ref 5–12)
NEUTROPHILS NFR BLD AUTO: 61.9 % (ref 42.7–76)
NEUTROPHILS NFR BLD AUTO: 9.01 10*3/MM3 (ref 1.7–7)
NITRITE UR QL STRIP: NEGATIVE
NRBC BLD AUTO-RTO: 0 /100 WBC (ref 0–0.2)
PH UR STRIP.AUTO: <=5 [PH] (ref 5–8)
PLATELET # BLD AUTO: 383 10*3/MM3 (ref 140–450)
PMV BLD AUTO: 9.2 FL (ref 6–12)
POTASSIUM SERPL-SCNC: 3.5 MMOL/L (ref 3.5–5.2)
PROT SERPL-MCNC: 8.1 G/DL (ref 6–8.5)
PROT UR QL STRIP: NEGATIVE
PROTHROMBIN TIME: 12.2 SECONDS (ref 11.5–14)
RBC # BLD AUTO: 4.16 10*6/MM3 (ref 3.77–5.28)
RBC # UR: ABNORMAL /HPF
REF LAB TEST METHOD: ABNORMAL
SODIUM SERPL-SCNC: 140 MMOL/L (ref 136–145)
SP GR UR STRIP: 1.01 (ref 1–1.03)
SQUAMOUS #/AREA URNS HPF: ABNORMAL /HPF
UROBILINOGEN UR QL STRIP: ABNORMAL
WBC # BLD AUTO: 14.57 10*3/MM3 (ref 3.4–10.8)
WBC UR QL AUTO: ABNORMAL /HPF

## 2021-03-22 PROCEDURE — 96376 TX/PRO/DX INJ SAME DRUG ADON: CPT

## 2021-03-22 PROCEDURE — 85025 COMPLETE CBC W/AUTO DIFF WBC: CPT | Performed by: EMERGENCY MEDICINE

## 2021-03-22 PROCEDURE — 99284 EMERGENCY DEPT VISIT MOD MDM: CPT

## 2021-03-22 PROCEDURE — 99024 POSTOP FOLLOW-UP VISIT: CPT | Performed by: PHYSICIAN ASSISTANT

## 2021-03-22 PROCEDURE — 80053 COMPREHEN METABOLIC PANEL: CPT | Performed by: EMERGENCY MEDICINE

## 2021-03-22 PROCEDURE — 81001 URINALYSIS AUTO W/SCOPE: CPT | Performed by: EMERGENCY MEDICINE

## 2021-03-22 PROCEDURE — 85730 THROMBOPLASTIN TIME PARTIAL: CPT | Performed by: EMERGENCY MEDICINE

## 2021-03-22 PROCEDURE — 25010000002 MORPHINE PER 10 MG: Performed by: EMERGENCY MEDICINE

## 2021-03-22 PROCEDURE — 72158 MRI LUMBAR SPINE W/O & W/DYE: CPT

## 2021-03-22 PROCEDURE — 25010000002 LORAZEPAM PER 2 MG: Performed by: EMERGENCY MEDICINE

## 2021-03-22 PROCEDURE — 25010000002 KETOROLAC TROMETHAMINE PER 15 MG: Performed by: EMERGENCY MEDICINE

## 2021-03-22 PROCEDURE — 85610 PROTHROMBIN TIME: CPT | Performed by: EMERGENCY MEDICINE

## 2021-03-22 PROCEDURE — 73610 X-RAY EXAM OF ANKLE: CPT

## 2021-03-22 PROCEDURE — 96365 THER/PROPH/DIAG IV INF INIT: CPT

## 2021-03-22 PROCEDURE — 25010000002 CEFTRIAXONE PER 250 MG: Performed by: EMERGENCY MEDICINE

## 2021-03-22 PROCEDURE — 96375 TX/PRO/DX INJ NEW DRUG ADDON: CPT

## 2021-03-22 RX ORDER — MORPHINE SULFATE 4 MG/ML
4 INJECTION, SOLUTION INTRAMUSCULAR; INTRAVENOUS ONCE
Status: COMPLETED | OUTPATIENT
Start: 2021-03-22 | End: 2021-03-22

## 2021-03-22 RX ORDER — KETOROLAC TROMETHAMINE 15 MG/ML
10 INJECTION, SOLUTION INTRAMUSCULAR; INTRAVENOUS ONCE
Status: COMPLETED | OUTPATIENT
Start: 2021-03-22 | End: 2021-03-22

## 2021-03-22 RX ORDER — SODIUM CHLORIDE 0.9 % (FLUSH) 0.9 %
10 SYRINGE (ML) INJECTION AS NEEDED
Status: DISCONTINUED | OUTPATIENT
Start: 2021-03-22 | End: 2021-03-23 | Stop reason: HOSPADM

## 2021-03-22 RX ORDER — LORAZEPAM 2 MG/ML
1 INJECTION INTRAMUSCULAR ONCE
Status: COMPLETED | OUTPATIENT
Start: 2021-03-22 | End: 2021-03-22

## 2021-03-22 RX ADMIN — SODIUM CHLORIDE 1 G: 900 INJECTION INTRAVENOUS at 23:34

## 2021-03-22 RX ADMIN — MORPHINE SULFATE 4 MG: 4 INJECTION, SOLUTION INTRAMUSCULAR; INTRAVENOUS at 23:34

## 2021-03-22 RX ADMIN — LORAZEPAM 1 MG: 2 INJECTION INTRAMUSCULAR; INTRAVENOUS at 23:34

## 2021-03-22 RX ADMIN — MORPHINE SULFATE 4 MG: 4 INJECTION, SOLUTION INTRAMUSCULAR; INTRAVENOUS at 22:49

## 2021-03-22 RX ADMIN — KETOROLAC TROMETHAMINE 10 MG: 15 INJECTION, SOLUTION INTRAMUSCULAR; INTRAVENOUS at 23:34
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
## 2021-03-22 NOTE — PROGRESS NOTES
Patient called at 5:56 PM stating that she fell twisted her ankle and hurt her back.  She said she was outside she went to get her mail and she fell.  She said she laid on the sidewalk for 45 minutes before she got up and got back in the house.  She says her ankle is swollen and her back hurts, she has called in [facility] to take her to the emergency room due to her ankle pain and swelling and back pain.     She has chronic bladder leakage, outpatient urology referral has been discussed with the patient.  She has right foot drop and is waiting on a custom AFO.    Cristy Calvo PA-C

## 2021-03-23 VITALS
WEIGHT: 218 LBS | HEIGHT: 66 IN | HEART RATE: 80 BPM | DIASTOLIC BLOOD PRESSURE: 82 MMHG | OXYGEN SATURATION: 98 % | TEMPERATURE: 98.2 F | RESPIRATION RATE: 18 BRPM | SYSTOLIC BLOOD PRESSURE: 126 MMHG | BODY MASS INDEX: 35.03 KG/M2

## 2021-03-23 PROCEDURE — 0 GADOBENATE DIMEGLUMINE 529 MG/ML SOLUTION: Performed by: EMERGENCY MEDICINE

## 2021-03-23 PROCEDURE — A9577 INJ MULTIHANCE: HCPCS | Performed by: EMERGENCY MEDICINE

## 2021-03-23 PROCEDURE — 25010000002 HYDROMORPHONE 1 MG/ML SOLUTION: Performed by: EMERGENCY MEDICINE

## 2021-03-23 PROCEDURE — 96375 TX/PRO/DX INJ NEW DRUG ADDON: CPT

## 2021-03-23 PROCEDURE — 72158 MRI LUMBAR SPINE W/O & W/DYE: CPT

## 2021-03-23 RX ADMIN — HYDROMORPHONE HYDROCHLORIDE 1 MG: 1 INJECTION, SOLUTION INTRAMUSCULAR; INTRAVENOUS; SUBCUTANEOUS at 00:45

## 2021-03-23 RX ADMIN — GADOBENATE DIMEGLUMINE 19 ML: 529 INJECTION, SOLUTION INTRAVENOUS at 01:24

## 2021-03-30 ENCOUNTER — TELEPHONE (OUTPATIENT)
Dept: NEUROSURGERY | Facility: CLINIC | Age: 51
End: 2021-03-30

## 2021-03-30 NOTE — TELEPHONE ENCOUNTER
Provider:  Jeet HINTON  Caller: patient  Time of call:   3:35  Phone #:  [phone]  Surgery:  Lumbar discectomy  Surgery Date:  03-02-21  Last visit:   same  Next visit:     BISMARK:         Reason for call:     Patient called and asked to speak with Jeet HINTON or Dr. Jefferson, however she did not leave a message.    I called her back and got the voice mail.  I asked that she call the clinical line and leave a detailed message.

## 2021-03-31 DIAGNOSIS — M48.061 SPINAL STENOSIS OF LUMBAR REGION, UNSPECIFIED WHETHER NEUROGENIC CLAUDICATION PRESENT: Primary | ICD-10-CM

## 2021-03-31 NOTE — TELEPHONE ENCOUNTER
I called patient again and she said that she spoke with Jeet HINTON early this morning and now she is just waiting for the  to call her.

## 2021-04-06 DIAGNOSIS — M54.50 LOW BACK PAIN, UNSPECIFIED BACK PAIN LATERALITY, UNSPECIFIED CHRONICITY, UNSPECIFIED WHETHER SCIATICA PRESENT: ICD-10-CM

## 2021-04-06 DIAGNOSIS — R29.898 WEAKNESS OF BOTH LOWER EXTREMITIES: ICD-10-CM

## 2021-04-07 NOTE — TELEPHONE ENCOUNTER
Provider:  Ronny  Caller:  Automated refill request  Past Surgical History:   Procedure Laterality Date   • LUMBAR DISCECTOMY Right 3/2/2021    Procedure: LUMBAR DISCECTOMY MICRO ENDOSCOPIC;  Surgeon: Nicolas Jefferson MD;  Location: [facility];  Service: Neurosurgery;  Laterality: Right;   Last visit:  Prior to sx  Next visit: 04/08/21 (tomorrow)    Reason for call:         Requested Prescriptions     Pending Prescriptions Disp Refills   • gabapentin (NEURONTIN) 300 MG capsule [Pharmacy Med Name: GABAPENTIN 300 MG CAPSULE] 90 capsule 0     Sig: TAKE ONE CAPSULE BY MOUTH THREE TIMES A DAY     Jonh: Request # : [id number]    02/26/2021 Gabapentin 300MG 1970 90 30 MONICA PEREZ [facility] 1  02/28/2021 Tramadol Hcl  50MG/50MG/50MG/50MG/50MG/  1970 40 10 SANDY JONES [facility] 20 1  03/01/2021 Oxycodone/Acetaminophen  325MG/7.5MG  1970 20 5 ASIA PASCUALOUD [facility] 45 1  03/08/2021 Diazepam 5MG 1970 30 10 RONNY TALAMANTES [facility] 1  03/08/2021 Hydrocodone/Acetaminophen  325MG/7.5MG  1970 40 10 RONNY TALAMANTES [facility] 30 1  03/17/2021 Carisoprodol 350MG 1970 39 9 ASIA CHAUDHARI [facility] 1

## 2021-04-08 ENCOUNTER — OFFICE VISIT (OUTPATIENT)
Dept: NEUROSURGERY | Facility: CLINIC | Age: 51
End: 2021-04-08

## 2021-04-08 ENCOUNTER — HOSPITAL ENCOUNTER (OUTPATIENT)
Dept: GENERAL RADIOLOGY | Facility: HOSPITAL | Age: 51
Discharge: HOME | End: 2021-04-08
Admitting: PHYSICIAN ASSISTANT

## 2021-04-08 VITALS
BODY MASS INDEX: 33.91 KG/M2 | HEIGHT: 66 IN | HEART RATE: 83 BPM | OXYGEN SATURATION: 98 % | WEIGHT: 211 LBS | TEMPERATURE: 97.5 F

## 2021-04-08 DIAGNOSIS — M48.061 SPINAL STENOSIS OF LUMBAR REGION, UNSPECIFIED WHETHER NEUROGENIC CLAUDICATION PRESENT: ICD-10-CM

## 2021-04-08 DIAGNOSIS — M54.16 LUMBAR RADICULOPATHY: Primary | ICD-10-CM

## 2021-04-08 PROCEDURE — 99024 POSTOP FOLLOW-UP VISIT: CPT | Performed by: PHYSICIAN ASSISTANT

## 2021-04-08 PROCEDURE — 72120 X-RAY BEND ONLY L-S SPINE: CPT

## 2021-04-08 NOTE — PROGRESS NOTES
Subjective   Patient ID: Shahana Cristobal is a 50 y.o. female is here today for follow-up for wound check.    HPI:      Patient is a nice 50-year-old female who came in in exquisite pain and acute right foot weakness and was operated on by Dr. Jefferson for a large synovial cyst..  This was done on the right side L4-5 area procedure performed on 3/2/2021.  Patient recovered fairly well in the hospital but was denied inpatient rehabilitation and patient went home.  Patient after the procedure was good planing of continued right dorsiflexor weakness but this got rapidly better on the day of discharge.  It is now returned to a peculiar right-sided foot drop.    Patient is also complained of urinary incontinence it sounds stress like and there is no sign of neurogenic bladder.    Incision is clean dry no sign infection bleeding erythema.  Patient has been fitted with LSO, and a AFO but patient does not have either on.  We will make arrangements for her to continue those and she is filing for disability.    I think with her current ambulatory status I do not think that it is reasonable that she would go back to work and we will support her in this.    The following portions of the patient's history were reviewed and updated as appropriate: allergies, current medications, past family history, past medical history, past social history, past surgical history and problem list.    Review of Systems   Constitutional: Negative for activity change, appetite change, chills, diaphoresis, fatigue, fever and unexpected weight change.   HENT: Negative for congestion, dental problem, drooling, ear discharge, ear pain, facial swelling, hearing loss, mouth sores, nosebleeds, postnasal drip, rhinorrhea, sinus pressure, sinus pain, sneezing, sore throat, tinnitus, trouble swallowing and voice change.    Eyes: Negative for photophobia, pain, discharge, redness, itching and visual disturbance.   Respiratory: Negative for apnea, cough,  "choking, chest tightness, shortness of breath, wheezing and stridor.    Cardiovascular: Negative for chest pain, palpitations and leg swelling.   Gastrointestinal: Negative for abdominal distention, abdominal pain, anal bleeding, blood in stool, constipation, diarrhea, nausea, rectal pain and vomiting.   Endocrine: Negative for cold intolerance, heat intolerance, polydipsia, polyphagia and polyuria.   Genitourinary: Negative for decreased urine volume, difficulty urinating, dyspareunia, dysuria, enuresis, flank pain, frequency, genital sores, hematuria, menstrual problem, pelvic pain, urgency, vaginal bleeding, vaginal discharge and vaginal pain.   Musculoskeletal: Positive for back pain and gait problem. Negative for arthralgias, joint swelling, myalgias, neck pain and neck stiffness.   Skin: Negative for color change, pallor, rash and wound.   Allergic/Immunologic: Negative for environmental allergies, food allergies and immunocompromised state.   Neurological: Negative for dizziness, tremors, seizures, syncope, facial asymmetry, speech difficulty, weakness, light-headedness, numbness and headaches.   Hematological: Negative for adenopathy. Does not bruise/bleed easily.   Psychiatric/Behavioral: Negative for agitation, behavioral problems, confusion, decreased concentration, dysphoric mood, hallucinations, self-injury, sleep disturbance and suicidal ideas. The patient is not nervous/anxious and is not hyperactive.          Objective    reports that she quit smoking about 3 months ago. Her smoking use included cigarettes. She has a 35.00 pack-year smoking history. She has never used smokeless tobacco. She reports previous alcohol use. She reports current drug use. Drug: Marijuana.   SMOKING STATUS: Non-smoker    Physical Exam:   Vitals:Pulse 83   Temp 97.5 °F (36.4 °C)   Ht 167.6 cm (65.98\")   Wt 95.7 kg (211 lb)   SpO2 98%   BMI 34.07 kg/m²    BMI: Body mass index is 34.07 kg/m².       Incision:   The " incision is well-healed and well approximated.  No signs of infection, bleeding, or erythema.    Musculoskeletal:                                            Dorsiflexion is 5/5 on left little effort given on right                    Plantarflexion is 5/5 bilaterally                    Hip Flexion 5/5 bilaterally.    Gait is incredibly abnormal and looks more like a movement disorder as opposed to a foot drop.                       Neurologic:                                         The patient is alert and oriented by 3.                       Sensation is equal bilaterally with no deficit.                        Reflexes:  2+ throughout       Assessment/Plan   Independent Review of Radiographic Studies:    Flexion-extension x-rays reviewed showed no mobile spondylolisthesis or other acute processes  Medical Decision Making:    Patient is in the middle of her recovery unfortunately not using her AFO that was prescribed.  I have sent her to get fitted for a lumbar support orthotic to hopefully help with some of the grabbing pains that she is having when she is going from seated to standing position.  Patient continues ambulating with a walker I have urged her against falling.    Patient is going to return home to [place] and file for disability.  Patient will also likely require pain management in the future and I will make that referral when she has insurance in [place].    Patient's Body mass index is 34.07 kg/m². BMI is above normal parameters. Recommendations include: educational material and exercise counseling.    There are no diagnoses linked to this encounter.  Return in about 6 weeks (around 5/20/2021).

## 2021-04-09 RX ORDER — GABAPENTIN 300 MG/1
CAPSULE ORAL
Qty: 90 CAPSULE | Refills: 1 | OUTPATIENT
Start: 2021-04-09

## 2021-04-12 ENCOUNTER — TELEPHONE (OUTPATIENT)
Dept: INTERNAL MEDICINE | Facility: CLINIC | Age: 51
End: 2021-04-12

## 2021-04-12 NOTE — TELEPHONE ENCOUNTER
Provider: CATHY MARQUEZ    Caller: AVLERIE    Relationship to Patient: [employer]     Phone Number: [phone]    Reason for Call: VALERIE FROM [employer] CALLED TO LET DOCTOR JIMMY KNOW THAT PATIENT IS WORKING WITH THEM AND STATED PATIENTS CARE PLAN, AND NOTES IS AVAILABLE VIA [facility] PROVIDER PORTAL, AND TO CALL WITH ANY QUESTIONS.

## 2021-05-20 ENCOUNTER — OFFICE VISIT (OUTPATIENT)
Dept: NEUROSURGERY | Facility: CLINIC | Age: 51
End: 2021-05-20

## 2021-05-20 DIAGNOSIS — M54.31 SCIATICA OF RIGHT SIDE: ICD-10-CM

## 2021-05-20 DIAGNOSIS — R29.898 WEAKNESS OF BOTH LOWER EXTREMITIES: ICD-10-CM

## 2021-05-20 DIAGNOSIS — M48.061 SPINAL STENOSIS OF LUMBAR REGION, UNSPECIFIED WHETHER NEUROGENIC CLAUDICATION PRESENT: Primary | ICD-10-CM

## 2021-05-20 DIAGNOSIS — M51.37 DDD (DEGENERATIVE DISC DISEASE), LUMBOSACRAL: ICD-10-CM

## 2021-05-20 PROCEDURE — 99442 PR PHYS/QHP TELEPHONE EVALUATION 11-20 MIN: CPT | Performed by: PHYSICIAN ASSISTANT
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
## 2021-06-07 ENCOUNTER — TELEPHONE (OUTPATIENT)
Dept: NEUROSURGERY | Facility: CLINIC | Age: 51
End: 2021-06-07

## 2021-06-07 NOTE — TELEPHONE ENCOUNTER
Caller: PT    Relationship: SELF    Best call back number: [phone]      Additional notes:PT IS IN ER IN [place] AND THE NEUROSURGEON'S ARE PROBABLY GOING TO CONTACT US. SHE WOULD LIKE FOR YOU TO CALL HER IF POSSIBLE TO DISCUSS WHAT IS GOING ON.  PLEASE ADVISE  THANK YOU           
I spoke with patient and she has been admitted to a hospital in [place] for lumbar nerve root compression.  The surgeon's there are requesting her operative report.    Patient has been given our fax number and will have the nurse fax it here to [facility].   
Provider:  Ronny  Caller: Shahana   Phone #:  [phone]  Surgery:  L4-5 disc  Surgery Date:  3/2/21  Last visit:   5/20/21  Next visit: PRN      Reason for call:  SUKHWINDER             
See previous note.  
No - the patient is unable to be screened due to medical condition

## 2022-05-31 NOTE — PROGRESS NOTES
"NEUROSURGERY PROGRESS NOTE    Interval History:   Hospital day #2: Intractable pain, L4-5 instability  Patient reports pain is minimally improved.  Reports continued weakness of right foot.  Pain and sensory alteration of her right groin region improving.  Reports some difficulty initiating urination, however able to feel sensation and voids independently on bedpan.    Vital Signs  Blood pressure 141/86, pulse 69, temperature 97.9 °F (36.6 °C), temperature source Oral, resp. rate 16, height 167.6 cm (66\"), weight 93.8 kg (206 lb 14.4 oz), SpO2 94 %, not currently breastfeeding.    Physical Exam:  AXOX3  Patient is tearful and in obvious discomfort  Unable to perform right foot dorsiflexion secondary to pain.   Right plantarflexion, knee extension and hip flexion is intact  Decreased sensation over lateral right calf and anterior foot  LLE strength and sensation well intact     Results Review:    I/O last 3 completed shifts:  In: 1180 [P.O.:1080; IV Piggyback:100]  Out: 4050 [Urine:4050]  No intake/output data recorded.    Assessment/Plan:   Discussed options with patient at this time. She is making minimal improvement. Due to scheduling this week we cannot add patient on for surgery. Patient has agreed to be discharged today. She states that her daughter can come and care for her until surgery is scheduled. Likely this will be early next week. I have added soma in place of flexeril and increased patient's gabapentin today.    Meghana Abarca PA-C  03/01/21  09:40 EST  " Patient/Caregiver provided printed discharge information.

## (undated) DEVICE — STRAP POSTN KN/BDY FM 5X72IN DISP

## (undated) DEVICE — PAD ARMBRD SURG CONVOL 7.5X20X2IN

## (undated) DEVICE — C-ARM: Brand: UNBRANDED

## (undated) DEVICE — NDL HYPO ECLPS SFTY 25G 1 1/2IN

## (undated) DEVICE — TOOL T12MH25M LEGEND 12CM 2.5MM MH 2FLT: Brand: MIDAS REX ™

## (undated) DEVICE — DRP MICROSCOPE 4 BINOCULAR CV 54X150IN

## (undated) DEVICE — 3M™ STERI-DRAPE™ INSTRUMENT POUCH 1018: Brand: STERI-DRAPE™

## (undated) DEVICE — PK NEURO DISC 10

## (undated) DEVICE — PATIENT RETURN ELECTRODE, SINGLE-USE, CONTACT QUALITY MONITORING, ADULT, WITH 9FT CORD, FOR PATIENTS WEIGING OVER 33LBS. (15KG): Brand: MEGADYNE

## (undated) DEVICE — NEEDLE, QUINCKE 22GX3.5": Brand: MEDLINE INDUSTRIES, INC.

## (undated) DEVICE — ELECTRD BLD EZ CLN MOD XLNG 2.75IN

## (undated) DEVICE — SCRB SURG BACTOSHIELD CHG 4PCT 4OZ

## (undated) DEVICE — Device

## (undated) DEVICE — THE FLOSEAL MALLEABLE TIP AND TRIMMABLE TIP ARE INTENDED FOR DELIVERY OF FLOSEAL HEMOSTATIC MATRIX.: Brand: FLOSEAL SPECIAL APPLICATOR TIPS

## (undated) DEVICE — ANTIBACTERIAL UNDYED BRAIDED (POLYGLACTIN 910), SYNTHETIC ABSORBABLE SUTURE: Brand: COATED VICRYL

## (undated) DEVICE — 1010 S-DRAPE TOWEL DRAPE 10/BX: Brand: STERI-DRAPE™

## (undated) DEVICE — 3M™ STERI-STRIP™ REINFORCED ADHESIVE SKIN CLOSURES, R1547, 1/2 IN X 4 IN (12 MM X 100 MM), 6 STRIPS/ENVELOPE: Brand: 3M™ STERI-STRIP™

## (undated) DEVICE — ELECTRD BLD EZ CLN MOD 6.5IN

## (undated) DEVICE — DRSNG SURESITE123 4X4.8IN

## (undated) DEVICE — SUT VIC 0 UR6 27IN VCP603H

## (undated) DEVICE — ADHS LIQ MASTISOL 2/3ML

## (undated) DEVICE — HDRST INTUB GENTLETOUCH SLOT 7IN RT

## (undated) DEVICE — SPNG GZ WOVN 4X4IN 12PLY 10/BX STRL

## (undated) DEVICE — GLV SURG PREMIERPRO MIC LTX PF SZ8 BRN

## (undated) DEVICE — 3M™ WARMING BLANKET, UPPER BODY, 10 PER CASE, 42268: Brand: BAIR HUGGER™

## (undated) DEVICE — RUBBERBAND LF STRL PK/2

## (undated) DEVICE — SYR CONTRL LUERLOK 10CC

## (undated) DEVICE — GLV SURG BIOGEL LTX PF 8